# Patient Record
Sex: MALE | Race: WHITE
[De-identification: names, ages, dates, MRNs, and addresses within clinical notes are randomized per-mention and may not be internally consistent; named-entity substitution may affect disease eponyms.]

---

## 2019-02-20 PROBLEM — Z00.00 ENCOUNTER FOR PREVENTIVE HEALTH EXAMINATION: Status: ACTIVE | Noted: 2019-02-20

## 2019-02-22 ENCOUNTER — APPOINTMENT (OUTPATIENT)
Age: 69
End: 2019-02-22

## 2019-02-22 ENCOUNTER — OUTPATIENT (OUTPATIENT)
Dept: OUTPATIENT SERVICES | Facility: HOSPITAL | Age: 69
LOS: 1 days | End: 2019-02-22

## 2019-02-22 DIAGNOSIS — H26.9 UNSPECIFIED CATARACT: ICD-10-CM

## 2019-09-12 ENCOUNTER — OFFICE VISIT (OUTPATIENT)
Dept: GASTROENTEROLOGY | Facility: CLINIC | Age: 69
End: 2019-09-12
Payer: MEDICARE

## 2019-09-12 VITALS — WEIGHT: 234 LBS | HEART RATE: 66 BPM | DIASTOLIC BLOOD PRESSURE: 78 MMHG | SYSTOLIC BLOOD PRESSURE: 128 MMHG

## 2019-09-12 DIAGNOSIS — Z86.010 HISTORY OF COLON POLYPS: ICD-10-CM

## 2019-09-12 DIAGNOSIS — K21.9 GASTROESOPHAGEAL REFLUX DISEASE, ESOPHAGITIS PRESENCE NOT SPECIFIED: Primary | ICD-10-CM

## 2019-09-12 DIAGNOSIS — R12 HEARTBURN: ICD-10-CM

## 2019-09-12 DIAGNOSIS — R13.14 PHARYNGOESOPHAGEAL DYSPHAGIA: ICD-10-CM

## 2019-09-12 PROCEDURE — 99204 OFFICE O/P NEW MOD 45 MIN: CPT | Performed by: INTERNAL MEDICINE

## 2019-09-12 RX ORDER — CARVEDILOL 6.25 MG/1
6.25 TABLET ORAL 2 TIMES DAILY WITH MEALS
COMMUNITY

## 2019-09-12 RX ORDER — LEVOTHYROXINE SODIUM 0.05 MG/1
50 TABLET ORAL DAILY
COMMUNITY

## 2019-09-12 RX ORDER — ISOSORBIDE DINITRATE 30 MG/1
30 TABLET ORAL 4 TIMES DAILY
COMMUNITY

## 2019-09-12 RX ORDER — FAMOTIDINE 20 MG/1
20 TABLET, FILM COATED ORAL 2 TIMES DAILY
COMMUNITY

## 2019-09-12 RX ORDER — ALBUTEROL SULFATE 90 UG/1
2 AEROSOL, METERED RESPIRATORY (INHALATION) EVERY 6 HOURS PRN
COMMUNITY

## 2019-09-12 RX ORDER — LEVALBUTEROL TARTRATE 45 UG/1
1-2 AEROSOL, METERED ORAL EVERY 4 HOURS PRN
COMMUNITY
End: 2019-09-24

## 2019-09-12 RX ORDER — CLOPIDOGREL BISULFATE 75 MG/1
75 TABLET ORAL DAILY
COMMUNITY

## 2019-09-12 RX ORDER — ATORVASTATIN CALCIUM 40 MG/1
40 TABLET, FILM COATED ORAL DAILY
COMMUNITY

## 2019-09-12 RX ORDER — NITROGLYCERIN 0.4 MG/1
0.4 TABLET SUBLINGUAL
COMMUNITY

## 2019-09-12 NOTE — H&P (VIEW-ONLY)
[facility]    Dear Valentina Huitron,    I had the pleasure of seeing your patient Donis Saint in the office today and I thank you for this kind referral        Chief Complaint:  Reflux      HPI:  Donis Saint is a 68 y o  male who presents with a long history of reflux symptomatology  According patient this is been getting worse  He now has a sensation of food sticking on the way down  This frequently causes him some respiratory difficulty while eating  He has to wash it down with small sips of water  He then feels that the food just lays there and sometimes regurgitates  He does not have actual early satiety  He has had some recent weight gain  No change in the consistency of his skin  No Raynaud's  He is having significant nocturnal symptomatology  He has no melena or hematochezia  He has no nausea or vomiting  He has heartburn  He does not have any significant exertional symptomatology  He does have a past history of heart disease but recently saw his cardiologist to feels he is stable  He does get dyspnea on exertion  He has no other definitive exacerbating or remitting factors for his GERD  He does not always weight 2-3 hours after eating before going to bed  He and his wife do have a tilting bed but they do not use it because it is uncomfortable for his wife  Therefore he sleeps flat out  He has no other GI symptomatology  He has a past history of colon polyps  His last colonoscopy was 5 years ago  He has no change in bowel habits, change in stool caliber, melanotic stool, hematochezia, rectal bleeding, tenesmus, or weight loss  He does have a stable 4 3 cm x 4 2 cm infrarenal abdominal aortic aneurysm  He has no chest pain dizziness or loss of consciousness        Review of Systems:   Constitutional: No fever or chills, feels well, no tiredness, positive weight gain  HENT: No complaints of earache, no hearing loss, no nosebleeds, no nasal discharge, no sore throat, no hoarseness  Eyes: No complaints of eye pain, no red eyes, no discharge from eyes, no itchy eyes  Cardiovascular: No complaints of slow heart rate, no fast heart rate, no chest pain, no palpitations, no leg claudication, no lower extremity edema  Respiratory:  Positive shortness of breath, no wheezing, no cough, no SOB on exertion, no orthopnea  Gastrointestinal: As noted in HPI  Genitourinary: No complaints of dysuria, no incontinence, no hesitancy, no nocturia  Musculoskeletal:  Positive arthralgia, no myalgias, no joint swelling or stiffness, no limb pain or swelling  Neurological: No complaints of headache, no confusion, no convulsions, no numbness or tingling, no dizziness or fainting, no limb weakness, no difficulty walking  Skin: No complaints of skin rash or skin lesions, no itching, no skin wound, no dry skin  Hematological/Lymphatic: No complaints of swollen glands, does not bleed easy  Allergic/Immunologic: No immunocompromised state  Endocrine:  No complaints of polyuria, no polydipsia  Psychiatric/Behavioral: is not suicidal, no sleep disturbances, no anxiety or depression, no change in personality, no emotional problems         Historical Information   Past Medical History:   Diagnosis Date    [facility] [address] )     [facility]     Coronary artery disease     GERD (gastroesophageal reflux disease)     Hyperglycemia     Hyperlipidemia     Hypertension     Hypothyroid     Obesity      Past Surgical History:   Procedure Laterality Date    CATARACT EXTRACTION      CHOLECYSTECTOMY      CORONARY ARTERY BYPASS GRAFT      HERNIA REPAIR      QUADRICEPSPLASTY  2011    TONSILLECTOMY       Social History   Social History     Substance and Sexual Activity   Alcohol Use Not on file     Social History     Substance and Sexual Activity   Drug Use Not on file     Social History     Tobacco Use   Smoking Status Not on file Family History   Problem Relation Age of Onset    Alzheimer's disease Mother     Arthritis Mother          Current Medications: has a current medication list which includes the following prescription(s): albuterol, aspirin, atorvastatin, carvedilol, clopidogrel, famotidine, isosorbide dinitrate, levalbuterol, levothyroxine, and nitroglycerin  Vital Signs: /78   Pulse 66   Wt 106 kg (234 lb)     Physical Exam:   Constitutional  General Appearance: No acute distress, well appearing and well nourished, obese  Head  Normocephalic  Eyes  Conjunctivae and lids: No swelling, erythema, or discharge  Pupils and irises: Equal, round and reactive to light  Ears, Nose, Mouth, and Throat  External inspection of ears and nose: Normal  Nasal mucosa, septum and turbinates: Normal without edema or erythema/   Oropharynx: Normal with no erythema, edema, exudate or lesions  Neck  Normal range of motion  Neck supple  Cardiovascular  Auscultation of the heart: Normal rate and rhythm, normal S1 and S2 without murmurs  Examination of the extremities for edema and/or varicosities: Normal  Pulmonary/Chest  Respiratory effort: No increased work of breathing or signs of respiratory distress  Mild gynecomastia  Auscultation of lungs: Clear to auscultation, equal breath sounds bilaterally, no wheezes, rales, no rhonchi  Abdomen  Abdomen: Non-tender, no masses  No succussion splash  Small  Reducible nontender umbilical hernia  Liver and spleen: No hepatomegaly or splenomegaly  Musculoskeletal  Gait and station: normal   Digits and Nails: normal without clubbing or cyanosis  Inspection/palpation of joints, bones, and muscles: Normal  Neurological  No nystagmus or asterixis  Skin  Skin and subcutaneous tissue: Normal without rashes or lesions  Lymphatic  Palpation of the lymph nodes in neck: No lymphadenopathy     Psychiatric  Orientation to person, place and time: Normal   Mood and affect: Normal  Labs:   No results found for: ALT, AST, BUN, CALCIUM, CL, CHOL, CO2, CREATININE, GFRAA, GFRNONAA, HDL, HCT, HGB, HGBA1C, LDL, MG, PHOS, PLT, K, PSA, NA, TRIG, WBC      X-Rays & Procedures:   No orders to display         ______________________________________________________________________      Assessment & Plan:      Diagnoses and all orders for this visit:    Gastroesophageal reflux disease, esophagitis presence not specified    Heartburn    Pharyngoesophageal dysphagia    History of colon polyps        I have taken liberty of scheduling patient for both EGD and colonoscopy  We will also obtain a gastric emptying study  We went over all of the anti reflux measures  He will obtain a wedge  He will begin to take his famotidine b i d  He will not eat within 3 hours of bedtime  Dietary recommendations were provided  Will be happy to inform you of his results and any further recommendations  I would like to thank you for allowing me to participate in his care              With warmest regards,    Ricky Cosby MD, [facility]

## 2019-09-12 NOTE — PROGRESS NOTES
[facility]    Dear Antionette Toro,    I had the pleasure of seeing your patient Sesar Hebert in the office today and I thank you for this kind referral        Chief Complaint:  Reflux      HPI:  Sesar Hebert is a 68 y o  male who presents with a long history of reflux symptomatology  According patient this is been getting worse  He now has a sensation of food sticking on the way down  This frequently causes him some respiratory difficulty while eating  He has to wash it down with small sips of water  He then feels that the food just lays there and sometimes regurgitates  He does not have actual early satiety  He has had some recent weight gain  No change in the consistency of his skin  No Raynaud's  He is having significant nocturnal symptomatology  He has no melena or hematochezia  He has no nausea or vomiting  He has heartburn  He does not have any significant exertional symptomatology  He does have a past history of heart disease but recently saw his cardiologist to feels he is stable  He does get dyspnea on exertion  He has no other definitive exacerbating or remitting factors for his GERD  He does not always weight 2-3 hours after eating before going to bed  He and his wife do have a tilting bed but they do not use it because it is uncomfortable for his wife  Therefore he sleeps flat out  He has no other GI symptomatology  He has a past history of colon polyps  His last colonoscopy was 5 years ago  He has no change in bowel habits, change in stool caliber, melanotic stool, hematochezia, rectal bleeding, tenesmus, or weight loss  He does have a stable 4 3 cm x 4 2 cm infrarenal abdominal aortic aneurysm  He has no chest pain dizziness or loss of consciousness        Review of Systems:   Constitutional: No fever or chills, feels well, no tiredness, positive weight gain  HENT: No complaints of earache, no hearing loss, no nosebleeds, no nasal discharge, no sore throat, no hoarseness  Eyes: No complaints of eye pain, no red eyes, no discharge from eyes, no itchy eyes  Cardiovascular: No complaints of slow heart rate, no fast heart rate, no chest pain, no palpitations, no leg claudication, no lower extremity edema  Respiratory:  Positive shortness of breath, no wheezing, no cough, no SOB on exertion, no orthopnea  Gastrointestinal: As noted in HPI  Genitourinary: No complaints of dysuria, no incontinence, no hesitancy, no nocturia  Musculoskeletal:  Positive arthralgia, no myalgias, no joint swelling or stiffness, no limb pain or swelling  Neurological: No complaints of headache, no confusion, no convulsions, no numbness or tingling, no dizziness or fainting, no limb weakness, no difficulty walking  Skin: No complaints of skin rash or skin lesions, no itching, no skin wound, no dry skin  Hematological/Lymphatic: No complaints of swollen glands, does not bleed easy  Allergic/Immunologic: No immunocompromised state  Endocrine:  No complaints of polyuria, no polydipsia  Psychiatric/Behavioral: is not suicidal, no sleep disturbances, no anxiety or depression, no change in personality, no emotional problems         Historical Information   Past Medical History:   Diagnosis Date    AAA [facility]ab[facility]) ([facility] [address]     [facility]     Coronary artery disease     GERD (gastroesophageal reflux disease)     Hyperglycemia     Hyperlipidemia     Hypertension     Hypothyroid     Obesity      Past Surgical History:   Procedure Laterality Date    CATARACT EXTRACTION      CHOLECYSTECTOMY      CORONARY ARTERY BYPASS GRAFT      HERNIA REPAIR      QUADRICEPSPLASTY  2011    TONSILLECTOMY       Social History   Social History     Substance and Sexual Activity   Alcohol Use Not on file     Social History     Substance and Sexual Activity   Drug Use Not on file     Social History     Tobacco Use   Smoking Status Not on file Family History   Problem Relation Age of Onset    Alzheimer's disease Mother     Arthritis Mother          Current Medications: has a current medication list which includes the following prescription(s): albuterol, aspirin, atorvastatin, carvedilol, clopidogrel, famotidine, isosorbide dinitrate, levalbuterol, levothyroxine, and nitroglycerin  Vital Signs: /78   Pulse 66   Wt 106 kg (234 lb)     Physical Exam:   Constitutional  General Appearance: No acute distress, well appearing and well nourished, obese  Head  Normocephalic  Eyes  Conjunctivae and lids: No swelling, erythema, or discharge  Pupils and irises: Equal, round and reactive to light  Ears, Nose, Mouth, and Throat  External inspection of ears and nose: Normal  Nasal mucosa, septum and turbinates: Normal without edema or erythema/   Oropharynx: Normal with no erythema, edema, exudate or lesions  Neck  Normal range of motion  Neck supple  Cardiovascular  Auscultation of the heart: Normal rate and rhythm, normal S1 and S2 without murmurs  Examination of the extremities for edema and/or varicosities: Normal  Pulmonary/Chest  Respiratory effort: No increased work of breathing or signs of respiratory distress  Mild gynecomastia  Auscultation of lungs: Clear to auscultation, equal breath sounds bilaterally, no wheezes, rales, no rhonchi  Abdomen  Abdomen: Non-tender, no masses  No succussion splash  Small  Reducible nontender umbilical hernia  Liver and spleen: No hepatomegaly or splenomegaly  Musculoskeletal  Gait and station: normal   Digits and Nails: normal without clubbing or cyanosis  Inspection/palpation of joints, bones, and muscles: Normal  Neurological  No nystagmus or asterixis  Skin  Skin and subcutaneous tissue: Normal without rashes or lesions  Lymphatic  Palpation of the lymph nodes in neck: No lymphadenopathy     Psychiatric  Orientation to person, place and time: Normal   Mood and affect: Normal  Labs:   No results found for: ALT, AST, BUN, CALCIUM, CL, CHOL, CO2, CREATININE, GFRAA, GFRNONAA, HDL, HCT, HGB, HGBA1C, LDL, MG, PHOS, PLT, K, PSA, NA, TRIG, WBC      X-Rays & Procedures:   No orders to display         ______________________________________________________________________      Assessment & Plan:      Diagnoses and all orders for this visit:    Gastroesophageal reflux disease, esophagitis presence not specified    Heartburn    Pharyngoesophageal dysphagia    History of colon polyps        I have taken liberty of scheduling patient for both EGD and colonoscopy  We will also obtain a gastric emptying study  We went over all of the anti reflux measures  He will obtain a wedge  He will begin to take his famotidine b i d  He will not eat within 3 hours of bedtime  Dietary recommendations were provided  Will be happy to inform you of his results and any further recommendations  I would like to thank you for allowing me to participate in his care              With warmest regards,    Roosevelt Candelario MD, [facility]

## 2019-09-12 NOTE — LETTER
September 12, 2019     Yanna Carrasco MD  [address] [place] [id number]    Patient: Orman Simmonds   YOB: 1950   Date of Visit: 9/12/2019       Dear Dr Bryan Junior:    Thank you for referring Orman Simmonds to me for evaluation  Below are my notes for this consultation  If you have questions, please do not hesitate to call me  I look forward to following your patient along with you  Sincerely,        Adrianna Evans MD        CC: No Recipients  Adrianna Evans MD  9/12/2019  3:12 PM  Incomplete  [facility]    Dear Eden Pitt,    I had the pleasure of seeing your patient Orman Simmonds in the office today and I thank you for this kind referral        Chief Complaint:  Reflux      HPI:  Orman Simmonds is a 68 y o  male who presents with a long history of reflux symptomatology  According patient this is been getting worse  He now has a sensation of food sticking on the way down  This frequently causes him some respiratory difficulty while eating  He has to wash it down with small sips of water  He then feels that the food just lays there and sometimes regurgitates  He does not have actual early satiety  He has had some recent weight gain  No change in the consistency of his skin  No Raynaud's  He is having significant nocturnal symptomatology  He has no melena or hematochezia  He has no nausea or vomiting  He has heartburn  He does not have any significant exertional symptomatology  He does have a past history of heart disease but recently saw his cardiologist to feels he is stable  He does get dyspnea on exertion  He has no other definitive exacerbating or remitting factors for his GERD  He does not always weight 2-3 hours after eating before going to bed  He and his wife do have a tilting bed but they do not use it because it is uncomfortable for his wife  Therefore he sleeps flat out  He has no other GI symptomatology    He has a past history of colon polyps  His last colonoscopy was 5 years ago  He has no change in bowel habits, change in stool caliber, melanotic stool, hematochezia, rectal bleeding, tenesmus, or weight loss  He does have a stable 4 3 cm x 4 2 cm infrarenal abdominal aortic aneurysm  He has no chest pain dizziness or loss of consciousness  Review of Systems:   Constitutional: No fever or chills, feels well, no tiredness, positive weight gain  HENT: No complaints of earache, no hearing loss, no nosebleeds, no nasal discharge, no sore throat, no hoarseness  Eyes: No complaints of eye pain, no red eyes, no discharge from eyes, no itchy eyes  Cardiovascular: No complaints of slow heart rate, no fast heart rate, no chest pain, no palpitations, no leg claudication, no lower extremity edema  Respiratory:  Positive shortness of breath, no wheezing, no cough, no SOB on exertion, no orthopnea  Gastrointestinal: As noted in HPI  Genitourinary: No complaints of dysuria, no incontinence, no hesitancy, no nocturia  Musculoskeletal:  Positive arthralgia, no myalgias, no joint swelling or stiffness, no limb pain or swelling  Neurological: No complaints of headache, no confusion, no convulsions, no numbness or tingling, no dizziness or fainting, no limb weakness, no difficulty walking  Skin: No complaints of skin rash or skin lesions, no itching, no skin wound, no dry skin  Hematological/Lymphatic: No complaints of swollen glands, does not bleed easy  Allergic/Immunologic: No immunocompromised state  Endocrine:  No complaints of polyuria, no polydipsia  Psychiatric/Behavioral: is not suicidal, no sleep disturbances, no anxiety or depression, no change in personality, no emotional problems         Historical Information   Past Medical History:   Diagnosis Date    [facility] ([facility] [address] [facility]     COPD (chronic obstructive pulmonary disease) (H[facility])     Coronary artery disease     GERD (gastroesophageal reflux disease)     Hyperglycemia     Hyperlipidemia     Hypertension     Hypothyroid     Obesity      Past Surgical History:   Procedure Laterality Date    CATARACT EXTRACTION      CHOLECYSTECTOMY      CORONARY ARTERY BYPASS GRAFT      HERNIA REPAIR      QUADRICEPSPLASTY  2011    TONSILLECTOMY       Social History   Social History     Substance and Sexual Activity   Alcohol Use Not on file     Social History     Substance and Sexual Activity   Drug Use Not on file     Social History     Tobacco Use   Smoking Status Not on file     Family History   Problem Relation Age of Onset    Alzheimer's disease Mother     Arthritis Mother          Current Medications: has a current medication list which includes the following prescription(s): albuterol, aspirin, atorvastatin, carvedilol, clopidogrel, famotidine, isosorbide dinitrate, levalbuterol, levothyroxine, and nitroglycerin  Vital Signs: /78   Pulse 66   Wt 106 kg (234 lb)     Physical Exam:   Constitutional  General Appearance: No acute distress, well appearing and well nourished, obese  Head  Normocephalic  Eyes  Conjunctivae and lids: No swelling, erythema, or discharge  Pupils and irises: Equal, round and reactive to light  Ears, Nose, Mouth, and Throat  External inspection of ears and nose: Normal  Nasal mucosa, septum and turbinates: Normal without edema or erythema/   Oropharynx: Normal with no erythema, edema, exudate or lesions  Neck  Normal range of motion  Neck supple  Cardiovascular  Auscultation of the heart: Normal rate and rhythm, normal S1 and S2 without murmurs  Examination of the extremities for edema and/or varicosities: Normal  Pulmonary/Chest  Respiratory effort: No increased work of breathing or signs of respiratory distress  Mild gynecomastia  Auscultation of lungs: Clear to auscultation, equal breath sounds bilaterally, no wheezes, rales, no rhonchi     Abdomen  Abdomen: Non-tender, no masses  No succussion splash  Small  Reducible nontender umbilical hernia  Liver and spleen: No hepatomegaly or splenomegaly  Musculoskeletal  Gait and station: normal   Digits and Nails: normal without clubbing or cyanosis  Inspection/palpation of joints, bones, and muscles: Normal  Neurological  No nystagmus or asterixis  Skin  Skin and subcutaneous tissue: Normal without rashes or lesions  Lymphatic  Palpation of the lymph nodes in neck: No lymphadenopathy  Psychiatric  Orientation to person, place and time: Normal   Mood and affect: Normal          Labs:   No results found for: ALT, AST, BUN, CALCIUM, CL, CHOL, CO2, CREATININE, GFRAA, GFRNONAA, HDL, HCT, HGB, HGBA1C, LDL, MG, PHOS, PLT, K, PSA, NA, TRIG, WBC      X-Rays & Procedures:   No orders to display         ______________________________________________________________________      Assessment & Plan:      Diagnoses and all orders for this visit:    Gastroesophageal reflux disease, esophagitis presence not specified    Heartburn    Pharyngoesophageal dysphagia    History of colon polyps        I have taken liberty of scheduling patient for both EGD and colonoscopy  We will also obtain a gastric emptying study  We went over all of the anti reflux measures  He will obtain a wedge  He will begin to take his famotidine b i d  He will not eat within 3 hours of bedtime  Dietary recommendations were provided  Will be happy to inform you of his results and any further recommendations  I would like to thank you for allowing me to participate in his care              With warmest regards,    Lily Patten MD, [facility]

## 2019-09-24 ENCOUNTER — ANESTHESIA EVENT (OUTPATIENT)
Dept: GASTROENTEROLOGY | Facility: HOSPITAL | Age: 69
End: 2019-09-24

## 2019-09-24 ENCOUNTER — HOSPITAL ENCOUNTER (OUTPATIENT)
Dept: GASTROENTEROLOGY | Facility: HOSPITAL | Age: 69
Setting detail: OUTPATIENT SURGERY
Discharge: HOME | End: 2019-09-24
Attending: INTERNAL MEDICINE | Admitting: INTERNAL MEDICINE
Payer: MEDICARE

## 2019-09-24 ENCOUNTER — ANESTHESIA (OUTPATIENT)
Dept: GASTROENTEROLOGY | Facility: HOSPITAL | Age: 69
End: 2019-09-24

## 2019-09-24 VITALS
SYSTOLIC BLOOD PRESSURE: 111 MMHG | HEIGHT: 66 IN | DIASTOLIC BLOOD PRESSURE: 54 MMHG | OXYGEN SATURATION: 91 % | RESPIRATION RATE: 20 BRPM | WEIGHT: 226.85 LBS | BODY MASS INDEX: 36.46 KG/M2 | HEART RATE: 91 BPM | TEMPERATURE: 97.1 F

## 2019-09-24 DIAGNOSIS — K21.9 GASTROESOPHAGEAL REFLUX DISEASE, ESOPHAGITIS PRESENCE NOT SPECIFIED: ICD-10-CM

## 2019-09-24 DIAGNOSIS — R13.14 PHARYNGOESOPHAGEAL DYSPHAGIA: ICD-10-CM

## 2019-09-24 DIAGNOSIS — Z86.010 HISTORY OF COLON POLYPS: ICD-10-CM

## 2019-09-24 DIAGNOSIS — R12 HEARTBURN: ICD-10-CM

## 2019-09-24 PROCEDURE — 88305 TISSUE EXAM BY PATHOLOGIST: CPT | Performed by: PATHOLOGY

## 2019-09-24 PROCEDURE — NC001 PR NO CHARGE: Performed by: INTERNAL MEDICINE

## 2019-09-24 PROCEDURE — 88312 SPECIAL STAINS GROUP 1: CPT | Performed by: PATHOLOGY

## 2019-09-24 PROCEDURE — 1123F ACP DISCUSS/DSCN MKR DOCD: CPT | Performed by: INTERNAL MEDICINE

## 2019-09-24 PROCEDURE — 43248 EGD GUIDE WIRE INSERTION: CPT | Performed by: INTERNAL MEDICINE

## 2019-09-24 PROCEDURE — 45385 COLONOSCOPY W/LESION REMOVAL: CPT | Performed by: INTERNAL MEDICINE

## 2019-09-24 PROCEDURE — 43239 EGD BIOPSY SINGLE/MULTIPLE: CPT | Performed by: INTERNAL MEDICINE

## 2019-09-24 RX ORDER — KETAMINE HCL IN NACL, ISO-OSM 100MG/10ML
SYRINGE (ML) INJECTION AS NEEDED
Status: DISCONTINUED | OUTPATIENT
Start: 2019-09-24 | End: 2019-09-24 | Stop reason: SURG

## 2019-09-24 RX ORDER — GLYCOPYRROLATE 0.2 MG/ML
INJECTION INTRAMUSCULAR; INTRAVENOUS AS NEEDED
Status: DISCONTINUED | OUTPATIENT
Start: 2019-09-24 | End: 2019-09-24 | Stop reason: SURG

## 2019-09-24 RX ORDER — ALBUTEROL SULFATE 90 UG/1
AEROSOL, METERED RESPIRATORY (INHALATION) AS NEEDED
Status: DISCONTINUED | OUTPATIENT
Start: 2019-09-24 | End: 2019-09-24 | Stop reason: SURG

## 2019-09-24 RX ORDER — PROPOFOL 10 MG/ML
INJECTION, EMULSION INTRAVENOUS AS NEEDED
Status: DISCONTINUED | OUTPATIENT
Start: 2019-09-24 | End: 2019-09-24 | Stop reason: SURG

## 2019-09-24 RX ORDER — SODIUM CHLORIDE, SODIUM LACTATE, POTASSIUM CHLORIDE, CALCIUM CHLORIDE 600; 310; 30; 20 MG/100ML; MG/100ML; MG/100ML; MG/100ML
INJECTION, SOLUTION INTRAVENOUS CONTINUOUS PRN
Status: DISCONTINUED | OUTPATIENT
Start: 2019-09-24 | End: 2019-09-24 | Stop reason: SURG

## 2019-09-24 RX ORDER — LIDOCAINE HYDROCHLORIDE 10 MG/ML
INJECTION, SOLUTION INFILTRATION; PERINEURAL AS NEEDED
Status: DISCONTINUED | OUTPATIENT
Start: 2019-09-24 | End: 2019-09-24 | Stop reason: SURG

## 2019-09-24 RX ADMIN — ALBUTEROL SULFATE 2 PUFF: 90 AEROSOL, METERED RESPIRATORY (INHALATION) at 10:56

## 2019-09-24 RX ADMIN — Medication 20 MG: at 11:00

## 2019-09-24 RX ADMIN — PROPOFOL 100 MG: 10 INJECTION, EMULSION INTRAVENOUS at 11:00

## 2019-09-24 RX ADMIN — SODIUM CHLORIDE, SODIUM LACTATE, POTASSIUM CHLORIDE, AND CALCIUM CHLORIDE: .6; .31; .03; .02 INJECTION, SOLUTION INTRAVENOUS at 10:49

## 2019-09-24 RX ADMIN — PROPOFOL 50 MG: 10 INJECTION, EMULSION INTRAVENOUS at 11:33

## 2019-09-24 RX ADMIN — GLYCOPYRROLATE 0.2 MG: 0.2 INJECTION, SOLUTION INTRAMUSCULAR; INTRAVENOUS at 11:00

## 2019-09-24 RX ADMIN — PROPOFOL 50 MG: 10 INJECTION, EMULSION INTRAVENOUS at 11:29

## 2019-09-24 RX ADMIN — PROPOFOL 50 MG: 10 INJECTION, EMULSION INTRAVENOUS at 11:11

## 2019-09-24 RX ADMIN — PROPOFOL 50 MG: 10 INJECTION, EMULSION INTRAVENOUS at 11:06

## 2019-09-24 RX ADMIN — LIDOCAINE HYDROCHLORIDE 50 MG: 10 INJECTION, SOLUTION INFILTRATION; PERINEURAL at 11:00

## 2019-09-24 RX ADMIN — PROPOFOL 50 MG: 10 INJECTION, EMULSION INTRAVENOUS at 11:18

## 2019-09-24 RX ADMIN — PROPOFOL 50 MG: 10 INJECTION, EMULSION INTRAVENOUS at 11:25

## 2019-09-24 NOTE — DISCHARGE INSTRUCTIONS

## 2019-09-24 NOTE — ANESTHESIA POSTPROCEDURE EVALUATION
Post-Op Assessment Note    CV Status:  Stable  Pain Score: 0    Pain management: adequate     Mental Status:  Somnolent   Hydration Status:  Euvolemic   PONV Controlled:  Controlled   Airway Patency:  Patent   Post Op Vitals Reviewed: Yes      Staff: CRNA           /59 (09/24/19 1141)    Temp      Pulse 97 (09/24/19 1141)   Resp 12 (09/24/19 1141)    SpO2 92 % (09/24/19 1141)

## 2019-09-24 NOTE — ANESTHESIA PREPROCEDURE EVALUATION
Review of Systems/Medical History  Patient summary reviewed  Chart reviewed      Cardiovascular  Exercise tolerance (METS): <4,  Hyperlipidemia, Hypertension , CAD , History of CABG, Cardiac stents  CHF compensated CHF, No BLUE,   Comment: Hx of CABG ~2011, Coronary stents ~2017  Denies CP/SOB  ,  Pulmonary  Smoker ex-smoker  , COPD moderate- medication dependent , No shortness of breath, Sleep apnea ,        GI/Hepatic    GERD ,             Endo/Other    Obesity    GYN       Hematology   Musculoskeletal    Arthritis     Neurology   Psychology   Negative psychology ROS              Physical Exam    Airway    Mallampati score: II  TM Distance: >3 FB       Dental   upper dentures and lower dentures,     Cardiovascular  Rhythm: regular, Rate: normal,     Pulmonary  Pulmonary exam normal     Other Findings        Anesthesia Plan  ASA Score- 3     Anesthesia Type- IV sedation with anesthesia with ASA Monitors  Additional Monitors:   Airway Plan:         Plan Factors-Patient not instructed to abstain from smoking on day of procedure  Patient did not smoke on day of surgery  Induction- intravenous  Postoperative Plan-     Informed Consent- Anesthetic plan and risks discussed with patient  I personally reviewed this patient with the CRNA  Discussed and agreed on the Anesthesia Plan with the CRNA  [place] Victor

## 2019-10-03 ENCOUNTER — TELEPHONE (OUTPATIENT)
Dept: GASTROENTEROLOGY | Facility: CLINIC | Age: 69
End: 2019-10-03

## 2019-11-14 ENCOUNTER — TELEPHONE (OUTPATIENT)
Dept: GASTROENTEROLOGY | Facility: CLINIC | Age: 69
End: 2019-11-14

## 2019-11-14 NOTE — TELEPHONE ENCOUNTER
Patient called want to know should he still get the gastric emptying study after having the EGD and Colonoscopy?

## 2019-11-14 NOTE — TELEPHONE ENCOUNTER
Please tell him yes, we need to complete the workup to test the stomach function and that is the only test that will do it

## 2019-12-13 ENCOUNTER — HOSPITAL ENCOUNTER (OUTPATIENT)
Dept: NUCLEAR MEDICINE | Facility: HOSPITAL | Age: 69
Discharge: HOME | End: 2019-12-13
Attending: INTERNAL MEDICINE
Payer: MEDICARE

## 2019-12-13 DIAGNOSIS — R12 HEARTBURN: ICD-10-CM

## 2019-12-13 DIAGNOSIS — K21.9 GASTROESOPHAGEAL REFLUX DISEASE, ESOPHAGITIS PRESENCE NOT SPECIFIED: ICD-10-CM

## 2019-12-13 PROCEDURE — A9541 TC99M SULFUR COLLOID: HCPCS

## 2019-12-13 PROCEDURE — 78264 GASTRIC EMPTYING IMG STUDY: CPT

## 2019-12-26 ENCOUNTER — TELEPHONE (OUTPATIENT)
Dept: GASTROENTEROLOGY | Facility: CLINIC | Age: 69
End: 2019-12-26

## 2021-02-24 ENCOUNTER — PREP FOR PROCEDURE (OUTPATIENT)
Dept: GASTROENTEROLOGY | Facility: CLINIC | Age: 71
End: 2021-02-24

## 2021-02-24 ENCOUNTER — LAB (OUTPATIENT)
Dept: LAB | Facility: HOSPITAL | Age: 71
End: 2021-02-24
Attending: INTERNAL MEDICINE
Payer: MEDICARE

## 2021-02-24 ENCOUNTER — OFFICE VISIT (OUTPATIENT)
Dept: GASTROENTEROLOGY | Facility: CLINIC | Age: 71
End: 2021-02-24
Payer: MEDICARE

## 2021-02-24 VITALS
DIASTOLIC BLOOD PRESSURE: 60 MMHG | HEIGHT: 65 IN | BODY MASS INDEX: 39.49 KG/M2 | SYSTOLIC BLOOD PRESSURE: 116 MMHG | HEART RATE: 87 BPM | WEIGHT: 237 LBS

## 2021-02-24 DIAGNOSIS — E03.8 OTHER SPECIFIED HYPOTHYROIDISM: ICD-10-CM

## 2021-02-24 DIAGNOSIS — Z86.010 HISTORY OF COLON POLYPS: ICD-10-CM

## 2021-02-24 DIAGNOSIS — R13.19 ESOPHAGEAL DYSPHAGIA: Primary | ICD-10-CM

## 2021-02-24 DIAGNOSIS — E03.8 OTHER SPECIFIED HYPOTHYROIDISM: Primary | ICD-10-CM

## 2021-02-24 DIAGNOSIS — K21.9 GASTROESOPHAGEAL REFLUX DISEASE WITHOUT ESOPHAGITIS: Primary | ICD-10-CM

## 2021-02-24 DIAGNOSIS — R13.19 ESOPHAGEAL DYSPHAGIA: ICD-10-CM

## 2021-02-24 LAB
T4 FREE SERPL-MCNC: 0.75 NG/DL (ref 0.76–1.46)
TSH SERPL DL<=0.05 MIU/L-ACNC: 4.77 UIU/ML (ref 0.36–3.74)

## 2021-02-24 PROCEDURE — 99214 OFFICE O/P EST MOD 30 MIN: CPT | Performed by: INTERNAL MEDICINE

## 2021-02-24 PROCEDURE — 84443 ASSAY THYROID STIM HORMONE: CPT

## 2021-02-24 PROCEDURE — 36415 COLL VENOUS BLD VENIPUNCTURE: CPT

## 2021-02-24 PROCEDURE — 84439 ASSAY OF FREE THYROXINE: CPT

## 2021-02-24 RX ORDER — PANTOPRAZOLE SODIUM 40 MG/1
40 TABLET, DELAYED RELEASE ORAL
Qty: 62 TABLET | Refills: 6 | Status: SHIPPED | OUTPATIENT
Start: 2021-02-24 | End: 2022-04-25 | Stop reason: SDUPTHER

## 2021-02-24 NOTE — PROGRESS NOTES
[facility] - Outpatient Follow-up Note  [facility] 66 y o  male MRN: [id number]  Encounter: [id number]          ASSESSMENT AND PLAN:      1  Gastroesophageal reflux disease without esophagitis    2  Esophageal dysphagia    3  History of colon polyps    Schedule egd with dilation    Stop pepcid    Rx pantoprazole 40 mg bid;   A prescription was sent to the pharmacy    Schedule colonoscopy 2022    ______________________________________________________________________    SUBJECTIVE:   This 42-year-old male comes to the office today for routine follow-up regarding his dysphagia complaint  He states that he has been having problems with ongoing dysphagia for the last several months  He is currently taking Pepcid 20 mg p o  b i d  and despite this he has continued to experience heartburn symptoms fairly frequently  He denies any abdominal pain  Denies any diarrhea or constipation  He denies nausea vomiting  His last esophagogastroduodenoscopy and colonoscopy were both performed on September 24, 2019  Dr Samuel Jacobo  Performed this examination  The EGD showed evidence of stricture in the distal esophagus which was dilated with an 18 mm Savary dilator  In addition to this the patient had a hiatal hernia  The patient's colonoscopy was   The remarkable for diverticulosis coli and 3 polyps of the colon, all of which were adenomas  REVIEW OF SYSTEMS IS OTHERWISE NEGATIVE        Historical Information   Past Medical History:   Diagnosis Date    AAA (abdominal aortic aneurysm) (HCC)     Colon polyp     COPD (chronic obstructive pulmonary disease) (HCC)     Coronary artery disease     Gallstone     GERD (gastroesophageal reflux disease)     Hyperglycemia     Hyperlipidemia     Hypertension     Hypothyroid     Obesity     Sleep apnea     not using prescribed cpap     Past Surgical History:   Procedure Laterality Date    CATARACT EXTRACTION      CHOLECYSTECTOMY      CORONARY ARTERY BYPASS GRAFT      HERNIA REPAIR      QUADRICEPSPLASTY  2011    TONSILLECTOMY       Social History   Social History     Substance and Sexual Activity   Alcohol Use Not Currently     Social History     Substance and Sexual Activity   Drug Use Not Currently     Social History     Tobacco Use   Smoking Status Former Smoker    Quit date: 2011    Years since quitting: 10 1   Smokeless Tobacco Never Used     Family History   Problem Relation Age of Onset    Alzheimer's disease Mother     Arthritis Mother        Meds/Allergies       Current Outpatient Medications:     albuterol (PROVENTIL HFA,VENTOLIN HFA) 90 mcg/act inhaler    aspirin 81 MG tablet    atorvastatin (LIPITOR) 40 mg tablet    carvedilol (COREG) 6 25 mg tablet    Cholecalciferol 50 MCG (2000 UT) CAPS    clopidogrel (PLAVIX) 75 mg tablet    famotidine (PEPCID) 20 mg tablet    isosorbide dinitrate (ISORDIL) 30 mg tablet    levothyroxine 50 mcg tablet    nitroglycerin (NITROSTAT) 0 4 mg SL tablet    umeclidinium-vilanterol (ANORO ELLIPTA) 62 5-25 MCG/INH inhaler    No Known Allergies        Objective     Blood pressure 116/60, pulse 87, height 5' 5" (1 651 m), weight 108 kg (237 lb)  Body mass index is 39 44 kg/m²  PHYSICAL EXAM:      General Appearance:   Alert, cooperative, no distress   HEENT:   Normocephalic, atraumatic, anicteric      Neck:  Supple, symmetrical, trachea midline   Lungs:   Clear to auscultation bilaterally; no rales, rhonchi or wheezing; respirations unlabored    Heart[de-identified]   Regular rate and rhythm; no murmur, rub, or gallop  Abdomen:   Soft, non-tender, non-distended; normal bowel sounds; no masses, no organomegaly    Genitalia:   Deferred    Rectal:   Deferred    Extremities:  No cyanosis, clubbing or edema    Pulses:  2+ and symmetric    Skin:  No jaundice, rashes, or lesions    Lymph nodes:  No palpable cervical lymphadenopathy        Lab Results:   No visits with results within 1 Day(s) from this visit  Latest known visit with results is:   Hospital Outpatient Visit on 09/24/2019   Component Date Value    Case Report 09/24/2019                      Value:Surgical Pathology Report                         Case: [id number]                                   Authorizing Provider:  Bridget Castillo MD      Collected:           09/24/2019 1119              Ordering Location:      [facility]       Received[facility]            [facility]/24/2019 [facility] [place] [facility]                                                             Pathologist:           Ellis Solano MD                                                              Specimens:   A) - Esophagogastric junction                                                                       B) - Polyp, Colorectal, proximal transverse                                                         C) - Polyp, Colorectal, hepatic flexure                                                             D) - Polyp, Colorectal, descending                                                         Addendum 09/24/2019                      Value: This result contains rich text formatting which cannot be displayed here   Final Diagnosis 09/24/2019                      Value: This result contains rich text formatting which cannot be displayed here   Note 09/24/2019                      Value: This result contains rich text formatting which cannot be displayed here   Additional Information 09/24/2019                      Value: This result contains rich text formatting which cannot be displayed here   Synoptic Checklist 09/24/2019                      Value:  (COLON/RECTUM POLYP FORM-GI - All Specimens)                                                                                                                 : Adenoma(s)     Kiki Cullen Description 09/24/2019                      Value: This result contains rich text formatting which cannot be displayed here   Clinical Information 09/24/2019                      Value:Cold bx r/o dysplasia         Radiology Results:   No results found

## 2021-02-25 ENCOUNTER — TELEPHONE (OUTPATIENT)
Dept: GASTROENTEROLOGY | Facility: CLINIC | Age: 71
End: 2021-02-25

## 2021-02-25 NOTE — TELEPHONE ENCOUNTER
Pt called had labs done yesterday dr Alyssia oRwley wanted to see outcome of the labs to be able to adjust medication for patient labs are in chart can we please review and send meds   meds can be sent to [facility] [address] can we please call patient to inform once this has happened

## 2021-02-25 NOTE — TELEPHONE ENCOUNTER
Please let him know that his levels are improving    He should continue to take his current dose of synthroid and f/u with his PCP

## 2021-03-09 DIAGNOSIS — Z23 ENCOUNTER FOR IMMUNIZATION: ICD-10-CM

## 2021-03-18 ENCOUNTER — ANESTHESIA (OUTPATIENT)
Dept: GASTROENTEROLOGY | Facility: HOSPITAL | Age: 71
End: 2021-03-18

## 2021-03-18 ENCOUNTER — ANESTHESIA EVENT (OUTPATIENT)
Dept: GASTROENTEROLOGY | Facility: HOSPITAL | Age: 71
End: 2021-03-18

## 2021-03-18 ENCOUNTER — HOSPITAL ENCOUNTER (OUTPATIENT)
Dept: GASTROENTEROLOGY | Facility: HOSPITAL | Age: 71
Setting detail: OUTPATIENT SURGERY
Discharge: HOME | End: 2021-03-18
Attending: INTERNAL MEDICINE | Admitting: INTERNAL MEDICINE
Payer: MEDICARE

## 2021-03-18 VITALS
HEIGHT: 65 IN | WEIGHT: 238.54 LBS | RESPIRATION RATE: 18 BRPM | BODY MASS INDEX: 39.74 KG/M2 | HEART RATE: 96 BPM | TEMPERATURE: 98.2 F | SYSTOLIC BLOOD PRESSURE: 143 MMHG | OXYGEN SATURATION: 95 % | DIASTOLIC BLOOD PRESSURE: 84 MMHG

## 2021-03-18 DIAGNOSIS — R13.19 ESOPHAGEAL DYSPHAGIA: ICD-10-CM

## 2021-03-18 PROBLEM — I10 HTN (HYPERTENSION): Status: ACTIVE | Noted: 2021-03-18

## 2021-03-18 PROBLEM — Z95.1 HISTORY OF CORONARY ARTERY BYPASS GRAFT: Status: ACTIVE | Noted: 2021-03-18

## 2021-03-18 PROBLEM — E78.5 HYPERLIPIDEMIA: Status: ACTIVE | Noted: 2021-03-18

## 2021-03-18 PROBLEM — I25.10 CAD (CORONARY ARTERY DISEASE): Status: ACTIVE | Noted: 2021-03-18

## 2021-03-18 PROBLEM — G47.30 SLEEP APNEA: Status: ACTIVE | Noted: 2021-03-18

## 2021-03-18 PROBLEM — J44.9 CHRONIC OBSTRUCTIVE PULMONARY DISEASE (HCC): Status: ACTIVE | Noted: 2021-03-18

## 2021-03-18 PROCEDURE — 88305 TISSUE EXAM BY PATHOLOGIST: CPT | Performed by: PATHOLOGY

## 2021-03-18 PROCEDURE — 43248 EGD GUIDE WIRE INSERTION: CPT | Performed by: INTERNAL MEDICINE

## 2021-03-18 PROCEDURE — 43239 EGD BIOPSY SINGLE/MULTIPLE: CPT | Performed by: INTERNAL MEDICINE

## 2021-03-18 RX ORDER — KETAMINE HCL IN NACL, ISO-OSM 100MG/10ML
SYRINGE (ML) INJECTION AS NEEDED
Status: DISCONTINUED | OUTPATIENT
Start: 2021-03-18 | End: 2021-03-18

## 2021-03-18 RX ORDER — LIDOCAINE HYDROCHLORIDE 20 MG/ML
INJECTION, SOLUTION EPIDURAL; INFILTRATION; INTRACAUDAL; PERINEURAL AS NEEDED
Status: DISCONTINUED | OUTPATIENT
Start: 2021-03-18 | End: 2021-03-18

## 2021-03-18 RX ORDER — PROPOFOL 10 MG/ML
INJECTION, EMULSION INTRAVENOUS AS NEEDED
Status: DISCONTINUED | OUTPATIENT
Start: 2021-03-18 | End: 2021-03-18

## 2021-03-18 RX ORDER — SODIUM CHLORIDE, SODIUM LACTATE, POTASSIUM CHLORIDE, CALCIUM CHLORIDE 600; 310; 30; 20 MG/100ML; MG/100ML; MG/100ML; MG/100ML
125 INJECTION, SOLUTION INTRAVENOUS CONTINUOUS
Status: DISCONTINUED | OUTPATIENT
Start: 2021-03-18 | End: 2021-03-22 | Stop reason: HOSPADM

## 2021-03-18 RX ORDER — GLYCOPYRROLATE 0.2 MG/ML
INJECTION INTRAMUSCULAR; INTRAVENOUS AS NEEDED
Status: DISCONTINUED | OUTPATIENT
Start: 2021-03-18 | End: 2021-03-18

## 2021-03-18 RX ORDER — SODIUM CHLORIDE, SODIUM LACTATE, POTASSIUM CHLORIDE, CALCIUM CHLORIDE 600; 310; 30; 20 MG/100ML; MG/100ML; MG/100ML; MG/100ML
INJECTION, SOLUTION INTRAVENOUS CONTINUOUS PRN
Status: DISCONTINUED | OUTPATIENT
Start: 2021-03-18 | End: 2021-03-18

## 2021-03-18 RX ADMIN — PROPOFOL 80 MG: 10 INJECTION, EMULSION INTRAVENOUS at 12:34

## 2021-03-18 RX ADMIN — LIDOCAINE HYDROCHLORIDE 100 MG: 20 INJECTION, SOLUTION EPIDURAL; INFILTRATION; INTRACAUDAL; PERINEURAL at 12:33

## 2021-03-18 RX ADMIN — Medication 20 MG: at 12:33

## 2021-03-18 RX ADMIN — GLYCOPYRROLATE 0.2 MG: 0.2 INJECTION, SOLUTION INTRAMUSCULAR; INTRAVENOUS at 12:28

## 2021-03-18 RX ADMIN — PROPOFOL 20 MG: 10 INJECTION, EMULSION INTRAVENOUS at 12:40

## 2021-03-18 RX ADMIN — SODIUM CHLORIDE, SODIUM LACTATE, POTASSIUM CHLORIDE, AND CALCIUM CHLORIDE: .6; .31; .03; .02 INJECTION, SOLUTION INTRAVENOUS at 12:28

## 2021-03-18 RX ADMIN — PROPOFOL 40 MG: 10 INJECTION, EMULSION INTRAVENOUS at 12:37

## 2021-03-18 NOTE — H&P
History and Physical - [facility]  [facility] 66 y o  male MRN: [id number]      HPI: [facility] is a 66y o  year old male who presents for evaluation of gastroesophageal reflux disease and dysphagia      REVIEW OF SYSTEMS: Per the HPI, and otherwise unremarkable  Historical Information   Past Medical History:   Diagnosis Date    AAA (abdominal aortic aneurysm) ([address] )     Colon polyp     COPD (chronic obstructive pulmonary disease) (HCC)     Coronary artery disease     Gallstone     GERD (gastroesophageal reflux disease)     Hyperglycemia     Hyperlipidemia     Hypertension     Hypothyroid     Obesity     Sleep apnea     not using prescribed cpap     Past Surgical History:   Procedure Laterality Date    CATARACT EXTRACTION      CHOLECYSTECTOMY      CORONARY ARTERY BYPASS GRAFT      HERNIA REPAIR      QUADRICEPSPLASTY  2011    TONSILLECTOMY       Social History   Social History     Substance and Sexual Activity   Alcohol Use Not Currently    Comment: quit 20 years ago     Social History     Substance and Sexual Activity   Drug Use Not Currently     Social History     Tobacco Use   Smoking Status Former Smoker    Quit date: 2011    Years since quitting: 10 2   Smokeless Tobacco Never Used     Family History   Problem Relation Age of Onset    Alzheimer's disease Mother     Arthritis Mother        Meds/Allergies     (Not in a hospital admission)      No Known Allergies    Objective     Blood pressure 132/86, pulse 83, temperature 97 5 °F (36 4 °C), temperature source Temporal, resp  rate 18, height 5' 5" (1 651 m), weight 108 kg (238 lb 8 6 oz), SpO2 99 %  PHYSICAL EXAM    Gen: NAD  CV: RRR  CHEST: Clear  ABD: soft, NT/ND  EXT: no edema      ASSESSMENT/PLAN:  This is a 66y o  year old male here for esophagogastroduodenoscopy with possible biopsy and dilation, and he is stable and optimized for his procedure

## 2021-03-18 NOTE — ANESTHESIA PREPROCEDURE EVALUATION
Procedure:  EGD         Physical Exam    Airway    Mallampati score: III  TM Distance: >3 FB  Neck ROM: full     Dental       Cardiovascular  Cardiovascular exam normal    Pulmonary  Pulmonary exam normal     Other Findings      COPD (chronic obstructive pulmonary disease) (HCC)    Hyperlipidemia    Hypertension    AAA (abdominal aortic aneurysm) (HCC)    GERD (gastroesophageal reflux disease)    Hypothyroid    Obesity    Hyperglycemia    Coronary artery disease    Sleep apnea not using prescribed cpap   Gallstone    Colon polyp        1  Gastroesophageal reflux disease without esophagitis     2  Esophageal dysphagia     3  History of colon polyps     Schedule egd with dilation    Anesthesia Plan  ASA Score- 3     Anesthesia Type- IV sedation with anesthesia with ASA Monitors  Additional Monitors:   Airway Plan:           Plan Factors-Exercise tolerance (METS): >4 METS  Chart reviewed  EKG reviewed  Existing labs reviewed  Patient summary reviewed  Induction- intravenous  Postoperative Plan-     Informed Consent- Anesthetic plan and risks discussed with patient  I personally reviewed this patient with the CRNA  Discussed and agreed on the Anesthesia Plan with the CRNA  Vince Jimenez

## 2021-03-18 NOTE — DISCHARGE INSTRUCTIONS
Upper Endoscopy   WHAT YOU NEED TO KNOW:   An upper endoscopy is also called an upper gastrointestinal (GI) endoscopy, or an esophagogastroduodenoscopy (EGD)  You may feel bloated, gassy, or have some abdominal discomfort after your procedure  Your throat may be sore for 24 to 36 hours  You may burp or pass gas from air that is still inside your body  DISCHARGE INSTRUCTIONS:   Call 911 for any of the following:   · You have sudden chest pain or trouble breathing  Seek care immediately if:   · You feel dizzy or faint  · You have trouble swallowing  · Your bowel movements are very dark or black  · Your abdomen is hard and firm and you have severe pain  · You vomit blood  Contact your healthcare provider if:   · You feel full or bloated and cannot burp or pass gas  · You have not had a bowel movement for 3 days after your procedure  · You have neck pain  · You have a fever or chills  · You have nausea or are vomiting  · You have a rash or hives  · You have questions or concerns about your endoscopy  Relieve a sore throat:  Suck on throat lozenges or crushed ice  Gargle with a small amount of warm salt water  Mix 1 teaspoon of salt and 1 cup of warm water to make salt water  Relieve gas and discomfort from bloating:  Lie on your right side with a heating pad on your abdomen  Take short walks to help pass gas  Eat small meals until bloating is relieved  Rest after your procedure: You have been given medicine to relax you  Do not  drive or make important decisions until the day after your procedure  Return to your normal activity as directed  You can usually return to work the day after your procedure  Follow up with your healthcare provider as directed:  Write down your questions so you remember to ask them during your visits     © 2017 [address] is for End User's use only and may not be sold, redistributed or otherwise used for commercial purposes  All illustrations and images included in CareNotes® are the copyrighted property of [employer] D [employer]  or [employer] Del [employer]ario  The above information is an  only  It is not intended as medical advice for individual conditions or treatments  Talk to your doctor, nurse or pharmacist before following any medical regimen to see if it is safe and effective for you

## 2021-03-18 NOTE — ANESTHESIA POSTPROCEDURE EVALUATION
Post-Op Assessment Note    CV Status:  Stable  Pain Score: 0    Pain management: adequate     Mental Status:  Sleepy and arousable   Hydration Status:  Stable   PONV Controlled:  None   Airway Patency:  Patent      Post Op Vitals Reviewed: Yes      Staff: CRNA         No complications documented      BP  121/59    Temp 98 7   Pulse 100   Resp 16   SpO2 94

## 2021-03-23 ENCOUNTER — TELEPHONE (OUTPATIENT)
Dept: GASTROENTEROLOGY | Facility: CLINIC | Age: 71
End: 2021-03-23

## 2021-03-23 NOTE — TELEPHONE ENCOUNTER
----- Message from [employer] Mahesh, DO sent at 3/22/2021  3:09 PM EDT -----  Please call the patient with the biopsy results  Biopsies of the stomach were benign and negative for Helicobacter pylori or cancer

## 2022-04-25 DIAGNOSIS — K21.9 GASTROESOPHAGEAL REFLUX DISEASE WITHOUT ESOPHAGITIS: ICD-10-CM

## 2022-04-25 NOTE — TELEPHONE ENCOUNTER
Dr Vanita Levi - patient called for refill   Pantoprazole  OV is scheduled for 06/08/22 with Dr Vanita Levi

## 2022-04-26 RX ORDER — PANTOPRAZOLE SODIUM 40 MG/1
40 TABLET, DELAYED RELEASE ORAL
Qty: 60 TABLET | Refills: 0 | Status: SHIPPED | OUTPATIENT
Start: 2022-04-26 | End: 2022-06-22

## 2022-06-22 DIAGNOSIS — K21.9 GASTROESOPHAGEAL REFLUX DISEASE WITHOUT ESOPHAGITIS: ICD-10-CM

## 2022-06-22 RX ORDER — PANTOPRAZOLE SODIUM 40 MG/1
TABLET, DELAYED RELEASE ORAL
Qty: 60 TABLET | Refills: 0 | Status: SHIPPED | OUTPATIENT
Start: 2022-06-22

## 2022-07-28 RX ORDER — TIOTROPIUM BROMIDE INHALATION SPRAY 3.12 UG/1
SPRAY, METERED RESPIRATORY (INHALATION) DAILY
COMMUNITY
Start: 2022-03-31

## 2022-07-28 RX ORDER — CHLORAL HYDRATE 500 MG
1 CAPSULE ORAL 2 TIMES DAILY
COMMUNITY
Start: 2022-05-26 | End: 2023-05-26

## 2022-07-28 RX ORDER — ISOSORBIDE MONONITRATE 30 MG/1
30 TABLET, EXTENDED RELEASE ORAL DAILY
COMMUNITY
Start: 2021-01-25

## 2022-07-29 ENCOUNTER — OFFICE VISIT (OUTPATIENT)
Dept: GASTROENTEROLOGY | Facility: CLINIC | Age: 72
End: 2022-07-29
Payer: MEDICARE

## 2022-07-29 VITALS
SYSTOLIC BLOOD PRESSURE: 112 MMHG | OXYGEN SATURATION: 94 % | HEART RATE: 86 BPM | WEIGHT: 237.8 LBS | HEIGHT: 65 IN | BODY MASS INDEX: 39.62 KG/M2 | DIASTOLIC BLOOD PRESSURE: 60 MMHG

## 2022-07-29 DIAGNOSIS — Z86.010 HISTORY OF COLON POLYPS: ICD-10-CM

## 2022-07-29 DIAGNOSIS — K21.9 GASTROESOPHAGEAL REFLUX DISEASE WITHOUT ESOPHAGITIS: Primary | ICD-10-CM

## 2022-07-29 DIAGNOSIS — K57.90 DIVERTICULAR DISEASE: ICD-10-CM

## 2022-07-29 PROCEDURE — 99214 OFFICE O/P EST MOD 30 MIN: CPT | Performed by: INTERNAL MEDICINE

## 2022-07-29 RX ORDER — PANTOPRAZOLE SODIUM 40 MG/1
40 TABLET, DELAYED RELEASE ORAL DAILY
Qty: 93 TABLET | Refills: 3 | Status: SHIPPED | OUTPATIENT
Start: 2022-07-29

## 2022-07-29 NOTE — PROGRESS NOTES
[facility] - Outpatient Follow-up Note  Noel Paulson 74 y o  male MRN: [id number]  Encounter: [id number]          ASSESSMENT AND PLAN:      1  Gastroesophageal reflux disease without esophagitis  - pantoprazole (PROTONIX) 40 mg tablet; Take 1 tablet (40 mg total) by mouth daily  Dispense: 93 tablet; Refill: 3    2  Diverticular disease  - Colonoscopy; Future    3  History of colon polyps  - Colonoscopy; Future    4  History of difficult peripheral IV access problems    ______________________________________________________________________    SUBJECTIVE:  This 28-year-old male comes the office today for the scheduling of a colonoscopy  His last colonoscopy was performed several years ago  He states that he has been feeling well  He denies any problems with bowel movement  His bowel movements on a daily basis  He denies any diarrhea or constipation, nausea vomiting, heartburn, dysphagia, odynophagia  He denies rectal bleeding, melena, hematemesis  There is no family history for colon cancer, esophageal cancer, or stomach cancer  He states that he does have a problem with IV access  In the past it has been very challenging to find veins in his arms due to the fact that he was a previous IV drug user  He states that he also needs a refill on pantoprazole  He was requesting a 3 month rather than a single month supply  The pharmacy to utilize is in [place]  REVIEW OF SYSTEMS IS OTHERWISE NEGATIVE        Historical Information   Past Medical History:   Diagnosis Date    AAA (abdominal aortic aneurysm) (HCC)     Colon polyp     COPD (chronic obstructive pulmonary disease) (HCC)     Coronary artery disease     Gallstone     GERD (gastroesophageal reflux disease)     Hyperglycemia     Hyperlipidemia     Hypertension     Hypothyroid     Obesity     Sleep apnea     not using prescribed cpap     Past Surgical History:   Procedure Laterality Date    CATARACT EXTRACTION  CHOLECYSTECTOMY      CORONARY ARTERY BYPASS GRAFT      HERNIA REPAIR      QUADRICEPSPLASTY  2011    TONSILLECTOMY       Social History   Social History     Substance and Sexual Activity   Alcohol Use Not Currently    Comment: quit 20 years ago     Social History     Substance and Sexual Activity   Drug Use Not Currently     Social History     Tobacco Use   Smoking Status Former Smoker    Quit date:     Years since quittin 5   Smokeless Tobacco Never Used     Family History   Problem Relation Age of Onset    Alzheimer's disease Mother     Arthritis Mother        Meds/Allergies       Current Outpatient Medications:     albuterol (PROVENTIL HFA,VENTOLIN HFA) 90 mcg/act inhaler    aspirin 81 MG tablet    atorvastatin (LIPITOR) 40 mg tablet    carvedilol (COREG) 6 25 mg tablet    Cholecalciferol 50 MCG ( UT) CAPS    clopidogrel (PLAVIX) 75 mg tablet    isosorbide mononitrate (IMDUR) 30 mg 24 hr tablet    levothyroxine 50 mcg tablet    nitroglycerin (NITROSTAT) 0 4 mg SL tablet    Omega-3 Fatty Acids (fish oil) 1,000 mg    pantoprazole (PROTONIX) 40 mg tablet    pantoprazole (PROTONIX) 40 mg tablet    sitaGLIPtin (JANUVIA) 50 mg tablet    tiotropium (Spiriva Respimat) 2 5 MCG/ACT AERS inhaler    famotidine (PEPCID) 20 mg tablet    isosorbide dinitrate (ISORDIL) 30 mg tablet    No Known Allergies        Objective     Blood pressure 112/60, pulse 86, height 5' 5" (1 651 m), weight 108 kg (237 lb 12 8 oz), SpO2 94 %  Body mass index is 39 57 kg/m²  PHYSICAL EXAM:      General Appearance:   Alert, cooperative, no distress   HEENT:   Normocephalic, atraumatic, anicteric      Neck:  Supple, symmetrical, trachea midline   Lungs:   Clear to auscultation bilaterally; no rales, rhonchi or wheezing; respirations unlabored    Heart[de-identified]   Regular rate and rhythm; no murmur, rub, or gallop     Abdomen:   Soft, non-tender, non-distended; normal bowel sounds; no masses, no organomegaly  Genitalia:   Deferred    Rectal:   Deferred    Extremities:  No cyanosis, clubbing or edema    Pulses:  2+ and symmetric    Skin:  No jaundice, rashes, or lesions    Lymph nodes:  No palpable cervical lymphadenopathy        Lab Results:   No visits with results within 1 Day(s) from this visit  Latest known visit with results is:   Hospital Outpatient Visit on 03/18/2021   Component Date Value    Case Report 03/18/2021                      Value:Surgical Pathology Report                         Case: [id number]                                   Authorizing Provider:  Jose Mullen DO          Collected:           03/18/2021 1239              Ordering Location[facility]      [facility]       Received:            03/18/2021 [address] End[facility]                                                             Pathologist:           Becky Du MD                                                        Specimen:    Stomach, antrum                                                                            Final Diagnosis 03/18/2021                      Value: This result contains rich text formatting which cannot be displayed here   Additional Information 03/18/2021                      Value: This result contains rich text formatting which cannot be displayed here  Kisha Mare Gross Description 03/18/2021                      Value: This result contains rich text formatting which cannot be displayed here   Clinical Information 03/18/2021                      Value:Cold bx r/o H Pylori         Radiology Results:   No results found

## 2022-07-29 NOTE — LETTER
August 2, 2022     Carmen Chavez MD  [address] [facility] [address]    Patient: Jimbo Jeffery   YOB: 1950   Date of Visit: 7/29/2022       Dear Dr Winsome Shrestha:    Thank you for referring Jimbo Jeffery to me for evaluation  Below are my notes for this consultation  If you have questions, please do not hesitate to call me  I look forward to following your patient along with you  Sincerely,        Erlin Perera DO        CC: No Recipients  Erlin Perera DO  7/29/2022 11:38 AM  Signed  [facility] - Outpatient Follow-up Note  Jimbo Jeffery 74 y o  male MRN: [id number]  Encounter: [id number]          ASSESSMENT AND PLAN:      1  Gastroesophageal reflux disease without esophagitis  - pantoprazole (PROTONIX) 40 mg tablet; Take 1 tablet (40 mg total) by mouth daily  Dispense: 93 tablet; Refill: 3    2  Diverticular disease  - Colonoscopy; Future    3  History of colon polyps  - Colonoscopy; Future    4  History of difficult peripheral IV access problems    ______________________________________________________________________    SUBJECTIVE:  This 78-year-old male comes the office today for the scheduling of a colonoscopy  His last colonoscopy was performed several years ago  He states that he has been feeling well  He denies any problems with bowel movement  His bowel movements on a daily basis  He denies any diarrhea or constipation, nausea vomiting, heartburn, dysphagia, odynophagia  He denies rectal bleeding, melena, hematemesis  There is no family history for colon cancer, esophageal cancer, or stomach cancer  He states that he does have a problem with IV access  In the past it has been very challenging to find veins in his arms due to the fact that he was a previous IV drug user  He states that he also needs a refill on pantoprazole  He was requesting a 3 month rather than a single month supply  The pharmacy to utilize is in [facility] OF SYSTEMS IS OTHERWISE NEGATIVE        Historical Information   Past Medical History:   Diagnosis Date    AAA (abdominal aortic aneurysm) ([address] )     Colon polyp     COPD (chronic obstructive pulmonary disease) (HCC)     Coronary artery disease     Gallstone     GERD (gastroesophageal reflux disease)     Hyperglycemia     Hyperlipidemia     Hypertension     Hypothyroid     Obesity     Sleep apnea     not using prescribed cpap     Past Surgical History:   Procedure Laterality Date    CATARACT EXTRACTION      CHOLECYSTECTOMY      CORONARY ARTERY BYPASS GRAFT      HERNIA REPAIR      QUADRICEPSPLASTY      TONSILLECTOMY       Social History   Social History     Substance and Sexual Activity   Alcohol Use Not Currently    Comment: quit 20 years ago     Social History     Substance and Sexual Activity   Drug Use Not Currently     Social History     Tobacco Use   Smoking Status Former Smoker    Quit date:     Years since quittin 5   Smokeless Tobacco Never Used     Family History   Problem Relation Age of Onset    Alzheimer's disease Mother     Arthritis Mother        Meds/Allergies       Current Outpatient Medications:     albuterol (PROVENTIL HFA,VENTOLIN HFA) 90 mcg/act inhaler    aspirin 81 MG tablet    atorvastatin (LIPITOR) 40 mg tablet    carvedilol (COREG) 6 25 mg tablet    Cholecalciferol 50 MCG ( [place]) CAPS    clopidogrel (PLAVIX) 75 mg tablet    isosorbide mononitrate (IMDUR) 30 mg 24 hr tablet    levothyroxine 50 mcg tablet    nitroglycerin (NITROSTAT) 0 4 mg SL tablet    Omega-3 Fatty Acids (fish oil) 1,000 mg    pantoprazole (PROTONIX) 40 mg tablet    pantoprazole (PROTONIX) 40 mg tablet    sitaGLIPtin (JANUVIA) 50 mg tablet    tiotropium (Spiriva Respimat) 2 5 MCG/ACT AERS inhaler    famotidine (PEPCID) 20 mg tablet    isosorbide dinitrate (ISORDIL) 30 mg tablet    No Known Allergies        Objective     Blood pressure 112/60, pulse 86, height 5' 5" (1 651 m), weight 108 kg (237 lb 12 8 oz), SpO2 94 %  Body mass index is 39 57 kg/m²  PHYSICAL EXAM:      General Appearance:   Alert, cooperative, no distress   HEENT:   Normocephalic, atraumatic, anicteric      Neck:  Supple, symmetrical, trachea midline   Lungs:   Clear to auscultation bilaterally; no rales, rhonchi or wheezing; respirations unlabored    Heart[de-identified]   Regular rate and rhythm; no murmur, rub, or gallop  Abdomen:   Soft, non-tender, non-distended; normal bowel sounds; no masses, no organomegaly    Genitalia:   Deferred    Rectal:   Deferred    Extremities:  No cyanosis, clubbing or edema    Pulses:  2+ and symmetric    Skin:  No jaundice, rashes, or lesions    Lymph nodes:  No palpable cervical lymphadenopathy        Lab Results:   No visits with results within 1 Day(s) from this visit  Latest known visit with results is:   Hospital Outpatient Visit on 03/18/2021   Component Date Value    Case Report 03/18/2021                      Value:Surgical Pathology Report                         Case: [id number]                                   Authorizing Provider:  Mason Callow, DO          Collected:           03/18/2021 1239              Ordering Location:      [facility]       Received:            03/18/2021 [address] [facility]                                                             Pathologist:           Trisha Urbina MD                                                        Specimen:    Stomach, antrum                                                                            Final Diagnosis 03/18/2021                      Value: This result contains rich text formatting which cannot be displayed here   Additional Information 03/18/2021                      Value: This result contains rich text formatting which cannot be displayed here  [facility] Gross Description 03/18/2021                      Value: This result contains rich text formatting which cannot be displayed here   Clinical Information 03/18/2021                      Value:Cold bx r/o H Pylori         Radiology Results:   No results found

## 2022-07-29 NOTE — PATIENT INSTRUCTIONS
Scheduled date of colonoscopy (as of today):10/4/22  Physician performing colonoscopy:kierra  Location of colonoscopy:[place]  Bowel prep reviewed with patient:mag cit/miralax  Instructions reviewed with patient by:malgorzata lindquist  Clearances:  none

## 2022-10-03 ENCOUNTER — TELEPHONE (OUTPATIENT)
Dept: GASTROENTEROLOGY | Facility: AMBULARY SURGERY CENTER | Age: 72
End: 2022-10-03

## 2022-10-03 NOTE — TELEPHONE ENCOUNTER
Patients GI provider:  Dr. Marquez    Number to return call: ( [phone]    Reason for call: Pt calling to have prep instructions emailed to him at [email or web address], went over dulcolax/miralax    Scheduled procedure/appointment date if applicable: Apt/procedure   10-4-22

## 2022-10-04 ENCOUNTER — HOSPITAL ENCOUNTER (OUTPATIENT)
Dept: GASTROENTEROLOGY | Facility: HOSPITAL | Age: 72
Setting detail: OUTPATIENT SURGERY
Discharge: HOME | End: 2022-10-04
Attending: INTERNAL MEDICINE
Payer: MEDICARE

## 2022-10-04 ENCOUNTER — ANESTHESIA (OUTPATIENT)
Dept: GASTROENTEROLOGY | Facility: HOSPITAL | Age: 72
End: 2022-10-04

## 2022-10-04 ENCOUNTER — ANESTHESIA EVENT (OUTPATIENT)
Dept: GASTROENTEROLOGY | Facility: HOSPITAL | Age: 72
End: 2022-10-04

## 2022-10-04 VITALS
WEIGHT: 236.77 LBS | HEART RATE: 75 BPM | DIASTOLIC BLOOD PRESSURE: 73 MMHG | OXYGEN SATURATION: 93 % | HEIGHT: 65 IN | RESPIRATION RATE: 22 BRPM | SYSTOLIC BLOOD PRESSURE: 128 MMHG | TEMPERATURE: 98.1 F | BODY MASS INDEX: 39.45 KG/M2

## 2022-10-04 DIAGNOSIS — Z86.010 HISTORY OF COLON POLYPS: ICD-10-CM

## 2022-10-04 DIAGNOSIS — K57.90 DIVERTICULAR DISEASE: ICD-10-CM

## 2022-10-04 PROBLEM — E03.4 HYPOTHYROIDISM DUE TO ACQUIRED ATROPHY OF THYROID: Status: ACTIVE | Noted: 2018-10-18

## 2022-10-04 PROBLEM — E11.9 TYPE 2 DIABETES MELLITUS WITHOUT COMPLICATION, WITHOUT LONG-TERM CURRENT USE OF INSULIN (HCC): Status: ACTIVE | Noted: 2021-04-14

## 2022-10-04 PROBLEM — K21.9 GASTROESOPHAGEAL REFLUX DISEASE WITHOUT ESOPHAGITIS: Status: ACTIVE | Noted: 2019-01-23

## 2022-10-04 PROBLEM — I71.40 ABDOMINAL AORTIC ANEURYSM (AAA) 3.0 CM TO 5.5 CM IN DIAMETER IN MALE: Status: ACTIVE | Noted: 2018-12-10

## 2022-10-04 PROBLEM — Z87.891 FORMER SMOKER: Status: ACTIVE | Noted: 2018-01-18

## 2022-10-04 LAB — GLUCOSE SERPL-MCNC: 161 MG/DL (ref 65–140)

## 2022-10-04 PROCEDURE — 45380 COLONOSCOPY AND BIOPSY: CPT | Performed by: INTERNAL MEDICINE

## 2022-10-04 PROCEDURE — 82948 REAGENT STRIP/BLOOD GLUCOSE: CPT

## 2022-10-04 PROCEDURE — 88305 TISSUE EXAM BY PATHOLOGIST: CPT | Performed by: PATHOLOGY

## 2022-10-04 RX ORDER — SODIUM CHLORIDE, SODIUM LACTATE, POTASSIUM CHLORIDE, CALCIUM CHLORIDE 600; 310; 30; 20 MG/100ML; MG/100ML; MG/100ML; MG/100ML
125 INJECTION, SOLUTION INTRAVENOUS CONTINUOUS
Status: DISCONTINUED | OUTPATIENT
Start: 2022-10-04 | End: 2022-10-08 | Stop reason: HOSPADM

## 2022-10-04 RX ORDER — LIDOCAINE HYDROCHLORIDE 10 MG/ML
0.5 INJECTION, SOLUTION EPIDURAL; INFILTRATION; INTRACAUDAL; PERINEURAL ONCE AS NEEDED
Status: DISCONTINUED | OUTPATIENT
Start: 2022-10-04 | End: 2022-10-08 | Stop reason: HOSPADM

## 2022-10-04 RX ORDER — PROPOFOL 10 MG/ML
INJECTION, EMULSION INTRAVENOUS AS NEEDED
Status: DISCONTINUED | OUTPATIENT
Start: 2022-10-04 | End: 2022-10-04

## 2022-10-04 RX ORDER — SODIUM CHLORIDE, SODIUM LACTATE, POTASSIUM CHLORIDE, CALCIUM CHLORIDE 600; 310; 30; 20 MG/100ML; MG/100ML; MG/100ML; MG/100ML
INJECTION, SOLUTION INTRAVENOUS CONTINUOUS PRN
Status: DISCONTINUED | OUTPATIENT
Start: 2022-10-04 | End: 2022-10-04

## 2022-10-04 RX ADMIN — PROPOFOL 200 MG: 10 INJECTION, EMULSION INTRAVENOUS at 09:41

## 2022-10-04 RX ADMIN — SODIUM CHLORIDE, SODIUM LACTATE, POTASSIUM CHLORIDE, AND CALCIUM CHLORIDE: .6; .31; .03; .02 INJECTION, SOLUTION INTRAVENOUS at 09:35

## 2022-10-04 NOTE — H&P
History and Physical - [facility]  [facility] 74 y o  male MRN: [id number]      HPI: [facility] is a 74y o  year old male who presents for for evaluation of diverticular disease and history of polyps      REVIEW OF SYSTEMS: Per the HPI, and otherwise unremarkable  Historical Information   Past Medical History:   Diagnosis Date    AAA (abdominal aortic aneurysm)     Colon polyp     COPD (chronic obstructive pulmonary disease) (HCC)     Coronary artery disease     Gallstone     GERD (gastroesophageal reflux disease)     Hyperglycemia     Hyperlipidemia     Hypertension     Hypothyroid     Obesity     Sleep apnea     not using prescribed cpap     Past Surgical History:   Procedure Laterality Date    CATARACT EXTRACTION      CHOLECYSTECTOMY      CORONARY ARTERY BYPASS GRAFT      HERNIA REPAIR      QUADRICEPSPLASTY  2011    TONSILLECTOMY       Social History   Social History     Substance and Sexual Activity   Alcohol Use Not Currently    Comment: quit 20 years ago     Social History     Substance and Sexual Activity   Drug Use Not Currently     Social History     Tobacco Use   Smoking Status Former Smoker    Quit date:     Years since quittin 7   Smokeless Tobacco Never Used     Family History   Problem Relation Age of Onset    Alzheimer's disease Mother     Arthritis Mother        Meds/Allergies     (Not in a hospital admission)      No Known Allergies    Objective     Blood pressure 139/81, pulse 83, temperature (!) 97 3 °F (36 3 °C), temperature source Temporal, resp  rate 20, height 5' 5" (1 651 m), weight 107 kg (236 lb 12 4 oz), SpO2 94 %  PHYSICAL EXAM    Gen: NAD  CV: RRR  CHEST: Clear  ABD: soft, NT/ND  EXT: no edema      ASSESSMENT/PLAN:  This is a 74y o  year old male here for colonoscopy, and he is stable and optimized for his procedure

## 2022-10-04 NOTE — ANESTHESIA POSTPROCEDURE EVALUATION
Post-Op Assessment Note    CV Status:  Stable  Pain Score: 0    Pain management: adequate     Mental Status:  Sleepy   Hydration Status:  Stable   PONV Controlled:  Controlled      Post Op Vitals Reviewed: Yes      Staff: Anesthesiologist, CRNA         No complications documented      /70 (10/04/22 0955)    Temp     Pulse 85 (10/04/22 0955)   Resp 17 (10/04/22 0955)    SpO2 93 % (10/04/22 0955)

## 2022-10-04 NOTE — ANESTHESIA PREPROCEDURE EVALUATION
Procedure:  COLONOSCOPY    Relevant Problems   CARDIO   (+) Abdominal aortic aneurysm (AAA) 3 0 cm to 5 5 cm in diameter in male   (+) CAD (coronary artery disease)   (+) HTN (hypertension)   (+) History of coronary artery bypass graft   (+) Hyperlipidemia      ENDO   (+) Hypothyroidism due to acquired atrophy of thyroid   (+) Type 2 diabetes mellitus without complication, without long-term current use of insulin (HCC)      GI/HEPATIC   (+) Gastroesophageal reflux disease without esophagitis      PULMONARY   (+) Chronic obstructive pulmonary disease (HCC)   (+) Sleep apnea      Other   (+) Former smoker   CT a/p 4/2022:   1  Fusiform aneurysmal dilation of infrarenal abdominal aorta which treated with endovascular stent graft with no evidence of endoleak    NST 3/2022:  FINDINGS: There are no fixed or reversible perfusion defects in the left   ventricle to suggest infarct or ischemia  There are no wall motion   abnormalities  The left ventricular ejection fraction is 58%  [facility] 9/2021:  Patent stent to left main   The stent extends into the LAD  Atretic LIMA to LAD seen  Competitive flow is seen in OM branch  This is supplied by free SANTO  Prior to the left main stenting-severe left main disease was seen  LIMA to LAD and SANTO to OM branch are now redundant  Likely occluded graft to RPL  From previous cath the native vessel was very small small size  RCA is extensively stented  Proximal RCA stent is patent with minimal disease   RPDA stent is patent with missing minimal disease     Mid RCA stent shows significant stenosis along with stenosis just prior to   the stent  PCI of this was done with VANDANA  Physical Exam    Airway    Mallampati score: II  TM Distance: >3 FB  Neck ROM: full     Dental       Cardiovascular      Pulmonary      Other Findings        Anesthesia Plan  ASA Score- 3     Anesthesia Type- IV sedation with anesthesia with ASA Monitors           Additional Monitors: Airway Plan:           Plan Factors-Exercise tolerance (METS): <4 METS  Chart reviewed  EKG reviewed  Patient summary reviewed  Induction- intravenous  Postoperative Plan-     Informed Consent- Anesthetic plan and risks discussed with patient  I personally reviewed this patient with the CRNA  Discussed and agreed on the Anesthesia Plan with the LARA Olivas

## 2022-10-12 PROCEDURE — 88305 TISSUE EXAM BY PATHOLOGIST: CPT | Performed by: PATHOLOGY

## 2022-10-13 ENCOUNTER — TELEPHONE (OUTPATIENT)
Dept: GASTROENTEROLOGY | Facility: CLINIC | Age: 72
End: 2022-10-13

## 2022-10-13 NOTE — TELEPHONE ENCOUNTER
----- Message from Ilan Neal DO sent at 10/12/2022  5:51 PM EDT -----  The please call the patient with the polyp results  The polyp was a precancerous polyp and completely removed    The patient is due for repeat colonoscopy in 5 years

## 2022-10-13 NOTE — TELEPHONE ENCOUNTER
Called pt and advised  Pt expressed his extreme gratitude to all of the staff and to Dr Anita Dueñas

## 2022-12-30 RX ORDER — SITAGLIPTIN AND METFORMIN HYDROCHLORIDE 1000; 50 MG/1; MG/1
1 TABLET, FILM COATED, EXTENDED RELEASE ORAL DAILY
COMMUNITY
Start: 2022-10-20

## 2023-01-04 ENCOUNTER — OFFICE VISIT (OUTPATIENT)
Dept: GASTROENTEROLOGY | Facility: CLINIC | Age: 73
End: 2023-01-04

## 2023-01-04 VITALS
WEIGHT: 230.8 LBS | HEIGHT: 65 IN | HEART RATE: 74 BPM | DIASTOLIC BLOOD PRESSURE: 60 MMHG | BODY MASS INDEX: 38.45 KG/M2 | OXYGEN SATURATION: 98 % | SYSTOLIC BLOOD PRESSURE: 104 MMHG

## 2023-01-04 DIAGNOSIS — E66.01 MORBID OBESITY WITH BMI OF 40.0-44.9, ADULT (HCC): ICD-10-CM

## 2023-01-04 DIAGNOSIS — K21.9 GASTROESOPHAGEAL REFLUX DISEASE WITHOUT ESOPHAGITIS: Primary | ICD-10-CM

## 2023-01-04 RX ORDER — PANTOPRAZOLE SODIUM 40 MG/1
40 TABLET, DELAYED RELEASE ORAL DAILY
Qty: 31 TABLET | Refills: 11 | Status: SHIPPED | OUTPATIENT
Start: 2023-01-04

## 2023-01-04 NOTE — PROGRESS NOTES
[facility] - Outpatient Follow-up Note  [facility] 74 y o  male MRN: [id number]  Encounter: [id number]          ASSESSMENT AND PLAN:      1  Gastroesophageal reflux disease without esophagitis  - pantoprazole (PROTONIX) 40 mg tablet; Take 1 tablet (40 mg total) by mouth daily  Dispense: 31 tablet; Refill: 11    2  Morbid obesity with BMI of 40 0-44 9, adult [facility])    Patient should continue taking pantoprazole 40 mg first thing in the morning at least a half an hour to 45 minutes before breakfast    Patient was advised to not be eating or drinking any food within 2 hours of lying down    There is no indication for either esophagogastroduodenoscopy or colonoscopy at this time    He was encouraged to begin exercising at least 3-4 times weekly which can begin with walking ______________________________________________________________________    SUBJECTIVE: I was a comes the office today for routine follow-up  He states that his gastroesophageal reflux disease is under good control as long as he takes pantoprazole 40 mg daily in the morning time, typically about 45 minutes before breakfast   He denies any dysphagia or odynophagia  He does admit to weight gain of 40 pounds over the past 3 years  He states that he does not exercise much  He denies any constipation or diarrhea  He further denies any rectal bleeding, hematemesis, melena  He states that he was giving his wife some of his pantoprazole so that she could better relieve the symptoms of her acid reflux  REVIEW OF SYSTEMS IS OTHERWISE NEGATIVE        Historical Information   Past Medical History:   Diagnosis Date   • AAA (abdominal aortic aneurysm)    • Colon polyp    • COPD (chronic obstructive pulmonary disease) (HCC)    • Coronary artery disease    • Gallstone    • GERD (gastroesophageal reflux disease)    • Hyperglycemia    • Hyperlipidemia    • Hypertension    • Hypothyroid    • Obesity    • Sleep apnea     not using prescribed cpap     Past Surgical History:   Procedure Laterality Date   • ABDOMINAL AORTIC ANEURYSM REPAIR, ENDOVASCULAR      Stent    • CATARACT EXTRACTION     • CHOLECYSTECTOMY     • CORONARY ARTERY BYPASS GRAFT     • HERNIA REPAIR     • QUADRICEPSPLASTY     • TONSILLECTOMY       Social History   Social History     Substance and Sexual Activity   Alcohol Use Not Currently    Comment: quit 20 years ago     Social History     Substance and Sexual Activity   Drug Use Not Currently     Social History     Tobacco Use   Smoking Status Former   • Types: Cigarettes   • Quit date:    • Years since quittin 0   Smokeless Tobacco Never     Family History   Problem Relation Age of Onset   • Alzheimer's disease Mother    • Arthritis Mother        Meds/Allergies       Current Outpatient Medications:   •  albuterol (PROVENTIL HFA,VENTOLIN HFA) 90 mcg/act inhaler  •  aspirin 81 MG tablet  •  atorvastatin (LIPITOR) 40 mg tablet  •  carvedilol (COREG) 6 25 mg tablet  •  Cholecalciferol 50 MCG (2000) CAPS  •  clopidogrel (PLAVIX) 75 mg tablet  •  Empagliflozin 25 MG TABS  •  famotidine (PEPCID) 20 mg tablet  •  isosorbide dinitrate (ISORDIL) 30 mg tablet  •  isosorbide mononitrate (IMDUR) 30 mg 24 hr tablet  •  levothyroxine 50 mcg tablet  •  nitroglycerin (NITROSTAT) 0 4 mg SL tablet  •  Omega-3 Fatty Acids (fish oil) 1,000 mg  •  pantoprazole (PROTONIX) 40 mg tablet  •  pantoprazole (PROTONIX) 40 mg tablet  •  SITagliptin-metFORMIN HCl ER (Janumet XR)  MG TB24  •  tiotropium (Spiriva Respimat) 2 5 MCG/ACT AERS inhaler    No Known Allergies        Objective     Blood pressure 104/60, pulse 74, height 5' 5" (1 651 m), weight 105 kg (230 lb 12 8 oz), SpO2 98 %  Body mass index is 38 41 kg/m²        PHYSICAL EXAM:      General Appearance:   Alert, cooperative, no distress   HEENT:   Normocephalic, atraumatic, anicteric      Neck:  Supple, symmetrical, trachea midline   Lungs:   Clear to auscultation bilaterally; no rales, rhonchi or wheezing; respirations unlabored    Heart[de-identified]   Regular rate and rhythm; no murmur, rub, or gallop  Abdomen:   Soft, non-tender, non-distended; normal bowel sounds; no masses, no organomegaly    Genitalia:   Deferred    Rectal:   Deferred    Extremities:  No cyanosis, clubbing or edema    Pulses:  2+ and symmetric    Skin:  No jaundice, rashes, or lesions    Lymph nodes:  No palpable cervical lymphadenopathy        Lab Results:   No visits with results within 1 Day(s) from this visit  Latest known visit with results is:   Hospital Outpatient Visit on 10/04/2022   Component Date Value   • POC Glucose 10/04/2022 161 (H)    • Case Report 10/04/2022                      Value:Surgical Pathology Report                         Case: [id number]                                   Authorizing Provider:  Lisa Kim DO          Collected:           10/04/2022 8016              Ordering Location:      [facility]       Received:            10/04/2022 [facility] Endoscopy                                                             Pathologist:           Love Sellers MD                                                          Specimen:    Polyp, Colorectal, cecum                                                                  • Final Diagnosis 10/04/2022                      Value: This result contains rich text formatting which cannot be displayed here  • Additional Information 10/04/2022                      Value: This result contains rich text formatting which cannot be displayed here  • Synoptic Checklist 10/04/2022                      Value:                            COLON/RECTUM POLYP FORM - GI - All Specimens                                                                                     :    Adenoma(s)                                                         :     Other     • Gross Description 10/04/2022 Value:This result contains rich text formatting which cannot be displayed here  • Clinical Information 10/04/2022                      Value:Cold bx polypectomy         Radiology Results:   No results found  Answers for HPI/ROS submitted by the patient on 12/28/2022  Frequency: rarely  Pain - numeric: 0/10  anorexia: No  arthralgias: Yes  belching: No  constipation: No  diarrhea: Yes  dysuria: No  fever: No  flatus: Yes  frequency: Yes  headaches: No  hematochezia: No  hematuria: No  melena: No  myalgias: Yes  nausea:  No  weight loss: No  vomiting: No  Aggravated by: nothing  Relieved by: activity  Diagnostic workup: upper endoscopy

## 2023-07-05 ENCOUNTER — OFFICE VISIT (OUTPATIENT)
Dept: GASTROENTEROLOGY | Facility: CLINIC | Age: 73
End: 2023-07-05
Payer: MEDICARE

## 2023-07-05 VITALS
WEIGHT: 220.8 LBS | SYSTOLIC BLOOD PRESSURE: 120 MMHG | OXYGEN SATURATION: 95 % | HEART RATE: 82 BPM | HEIGHT: 65 IN | DIASTOLIC BLOOD PRESSURE: 62 MMHG | BODY MASS INDEX: 36.79 KG/M2

## 2023-07-05 DIAGNOSIS — Z86.010 HISTORY OF COLON POLYPS: ICD-10-CM

## 2023-07-05 DIAGNOSIS — K21.9 GASTROESOPHAGEAL REFLUX DISEASE WITHOUT ESOPHAGITIS: Primary | ICD-10-CM

## 2023-07-05 PROCEDURE — 99214 OFFICE O/P EST MOD 30 MIN: CPT | Performed by: INTERNAL MEDICINE

## 2023-07-05 RX ORDER — PANTOPRAZOLE SODIUM 40 MG/1
40 TABLET, DELAYED RELEASE ORAL DAILY
Qty: 31 TABLET | Refills: 6 | Status: SHIPPED | OUTPATIENT
Start: 2023-07-05

## 2024-01-10 ENCOUNTER — OFFICE VISIT (OUTPATIENT)
Dept: GASTROENTEROLOGY | Facility: CLINIC | Age: 74
End: 2024-01-10
Payer: MEDICARE

## 2024-01-10 VITALS
HEART RATE: 90 BPM | WEIGHT: 221.2 LBS | HEIGHT: 66 IN | SYSTOLIC BLOOD PRESSURE: 137 MMHG | DIASTOLIC BLOOD PRESSURE: 89 MMHG | OXYGEN SATURATION: 95 % | BODY MASS INDEX: 35.55 KG/M2

## 2024-01-10 DIAGNOSIS — K21.9 GASTROESOPHAGEAL REFLUX DISEASE WITHOUT ESOPHAGITIS: Primary | ICD-10-CM

## 2024-01-10 DIAGNOSIS — K57.90 DIVERTICULAR DISEASE: ICD-10-CM

## 2024-01-10 DIAGNOSIS — E66.01 MORBID OBESITY WITH BMI OF 40.0-44.9, ADULT (HCC): ICD-10-CM

## 2024-01-10 PROCEDURE — 99214 OFFICE O/P EST MOD 30 MIN: CPT | Performed by: INTERNAL MEDICINE

## 2024-01-10 RX ORDER — PANTOPRAZOLE SODIUM 40 MG/1
40 TABLET, DELAYED RELEASE ORAL DAILY
Qty: 31 TABLET | Refills: 11 | Status: SHIPPED | OUTPATIENT
Start: 2024-01-10

## 2024-01-10 RX ORDER — IPRATROPIUM BROMIDE 42 UG/1
SPRAY, METERED NASAL
COMMUNITY
Start: 2024-01-06

## 2024-01-10 NOTE — PROGRESS NOTES
[facility] - Outpatient Follow-up Note  Franki Benoit 73 y.o. male MRN: [id number]  Encounter: [id number]          ASSESSMENT AND PLAN:      1. Gastroesophageal reflux disease without esophagitis  - pantoprazole (PROTONIX) 40 mg tablet; Take 1 tablet (40 mg total) by mouth daily  Dispense: 31 tablet; Refill: 11    2. Morbid obesity with BMI of 40.0-44.9, adult (HCC)    3. Diverticular disease    4.  Diarrhea, intermittent  -Continue high-fiber diet  -No additional workup warranted at this time    5.  History of colon polyp  -His next and final colonoscopy will be in 2029 at age 78    ______________________________________________________________________    SUBJECTIVE: Claudio comes to the office today for routine follow-up.  He states that he has been feeling well.  He is well aware that he has a hiatal hernia that was diagnosed by endoscopy back in March 18, 2021.  He states that his heartburn symptoms are under good control as long as he takes pantoprazole 40 mg in the morning time half an hour before breakfast.  He also states that stopping eating or snacking late at night has been very beneficial to the improvement of his gastroesophageal reflux disease.  He had undergone a barium swallow back in 2022 which demonstrated evidence of regurgitation.  He denies any rectal bleeding.  He denied bloating or gaseousness.  He denies constipation but does admit to an episode of diarrhea 1 time a day every 2 weeks.  He was not able to identify any food substances seem to provoke him to develop the diarrhea.  He states that he would like a refill of his medication.  His last colonoscopy was performed October 4, 2022 and that showed diverticulosis in the left colon as well as a single polyp which turned out to be an adenoma.      REVIEW OF SYSTEMS IS OTHERWISE NEGATIVE.      Historical Information   Past Medical History:   Diagnosis Date    AAA (abdominal aortic aneurysm) (HCC)     Colon polyp     COPD  "(chronic obstructive pulmonary disease) (HCC)     Coronary artery disease     Gallstone     GERD (gastroesophageal reflux disease)     Hyperglycemia     Hyperlipidemia     Hypertension     Hypothyroid     Obesity     Sleep apnea     not using prescribed cpap     Past Surgical History:   Procedure Laterality Date    ABDOMINAL AORTIC ANEURYSM REPAIR, ENDOVASCULAR      Stent     CATARACT EXTRACTION      CHOLECYSTECTOMY      CORONARY ARTERY BYPASS GRAFT      HERNIA REPAIR      QUADRICEPSPLASTY  2011    TONSILLECTOMY       Social History   Social History     Substance and Sexual Activity   Alcohol Use Not Currently    Comment: quit 20 years ago     Social History     Substance and Sexual Activity   Drug Use Not Currently     Social History     Tobacco Use   Smoking Status Former    Current packs/day: 0.00    Types: Cigarettes    Quit date:     Years since quittin.0   Smokeless Tobacco Never     Family History   Problem Relation Age of Onset    Alzheimer's disease Mother     Arthritis Mother        Meds/Allergies       Current Outpatient Medications:     albuterol (PROVENTIL HFA,VENTOLIN HFA) 90 mcg/act inhaler    aspirin 81 MG tablet    atorvastatin (LIPITOR) 40 mg tablet    carvedilol (COREG) 6.25 mg tablet    Cholecalciferol 50 MCG (2000) CAPS    clopidogrel (PLAVIX) 75 mg tablet    Empagliflozin 25 MG TABS    ipratropium (ATROVENT) 0.06 % nasal spray    isosorbide dinitrate (ISORDIL) 30 mg tablet    isosorbide mononitrate (IMDUR) 30 mg 24 hr tablet    levothyroxine 50 mcg tablet    nitroglycerin (NITROSTAT) 0.4 mg SL tablet    pantoprazole (PROTONIX) 40 mg tablet    pantoprazole (PROTONIX) 40 mg tablet    SITagliptin-metFORMIN HCl ER (Janumet XR)  MG TB24    tiotropium (SPIRIVA RESPIMAT) 2.5 MCG/ACT AERS inhaler    Omega-3 Fatty Acids (fish oil) 1,000 mg    pantoprazole (PROTONIX) 40 mg tablet    No Known Allergies        Objective     Blood pressure 137/89, pulse 90, height 5' 6\" (1.676 m), " weight 100 kg (221 lb 3.2 oz), SpO2 95%. Body mass index is 35.7 kg/m².      PHYSICAL EXAM:      General Appearance:   Alert, cooperative, no distress   HEENT:   Normocephalic, atraumatic, anicteric.     Neck:  Supple, symmetrical, trachea midline   Lungs:   Clear to auscultation bilaterally; no rales, rhonchi or wheezing; respirations unlabored    Heart::   Regular rate and rhythm; no murmur, rub, or gallop.   Abdomen:   Soft, non-tender, non-distended; normal bowel sounds; no masses, no organomegaly    Genitalia:   Deferred    Rectal:   Deferred    Extremities:  No cyanosis, clubbing or edema    Pulses:  2+ and symmetric    Skin:  No jaundice, rashes, or lesions    Lymph nodes:  No palpable cervical lymphadenopathy        Lab Results:   No visits with results within 1 Day(s) from this visit.   Latest known visit with results is:   Hospital Outpatient Visit on 10/04/2022   Component Date Value    POC Glucose 10/04/2022 161 (H)     Case Report 10/04/2022                      Value:Surgical Pathology Report                         Case: [id number]                                   Authorizing Provider:  Mejia Marquez DO          Collected:           10/04/2022 0948              Ordering Location:      [facility]       Received:            10/04/2022 1302                                     [facility]                                                             Pathologist:           Arcadio Donahue MD                                                          Specimen:    Polyp, Colorectal, cecum                                                                   Final Diagnosis 10/04/2022                      Value:This result contains rich text formatting which cannot be displayed here.    Additional Information 10/04/2022                      Value:This result contains rich text formatting which cannot be displayed here.    Synoptic Checklist 10/04/2022                      Value:                             COLON/RECTUM POLYP FORM - GI - All Specimens                                                                                     :    Adenoma(s)                                                         :    Other      Gross Description 10/04/2022                      Value:This result contains rich text formatting which cannot be displayed here.    Clinical Information 10/04/2022                      Value:Cold bx polypectomy         Radiology Results:   No results found.  Answers submitted by the patient for this visit:  Abdominal Pain Questionnaire (Submitted on 1/3/2024)  Chief Complaint: Abdominal pain  Progression since onset: resolved  Pain - numeric: 0/10  frequency: Yes

## 2024-08-08 DIAGNOSIS — K21.9 GASTROESOPHAGEAL REFLUX DISEASE WITHOUT ESOPHAGITIS: ICD-10-CM

## 2024-08-08 RX ORDER — PANTOPRAZOLE SODIUM 40 MG/1
40 TABLET, DELAYED RELEASE ORAL DAILY
Qty: 100 TABLET | Refills: 1 | Status: SHIPPED | OUTPATIENT
Start: 2024-08-08

## 2025-02-07 ENCOUNTER — OFFICE VISIT (OUTPATIENT)
Dept: GASTROENTEROLOGY | Facility: CLINIC | Age: 75
End: 2025-02-07
Payer: MEDICARE

## 2025-02-07 VITALS
DIASTOLIC BLOOD PRESSURE: 79 MMHG | TEMPERATURE: 97.6 F | BODY MASS INDEX: 34.66 KG/M2 | OXYGEN SATURATION: 92 % | WEIGHT: 208 LBS | HEIGHT: 65 IN | SYSTOLIC BLOOD PRESSURE: 122 MMHG | HEART RATE: 79 BPM

## 2025-02-07 DIAGNOSIS — K21.9 GASTROESOPHAGEAL REFLUX DISEASE WITHOUT ESOPHAGITIS: Primary | ICD-10-CM

## 2025-02-07 PROCEDURE — 99214 OFFICE O/P EST MOD 30 MIN: CPT | Performed by: INTERNAL MEDICINE

## 2025-02-07 RX ORDER — KETOCONAZOLE 20 MG/ML
SHAMPOO, SUSPENSION TOPICAL
COMMUNITY
Start: 2024-11-19

## 2025-02-07 RX ORDER — TRIAMCINOLONE ACETONIDE 0.25 MG/G
OINTMENT TOPICAL
COMMUNITY
Start: 2024-11-21

## 2025-02-07 RX ORDER — BETAMETHASONE DIPROPIONATE 0.5 MG/G
CREAM TOPICAL
COMMUNITY
Start: 2024-11-19

## 2025-02-08 NOTE — PROGRESS NOTES
"Name: Franki Benoit      : 1950      MRN: [id number]  Encounter Provider: Mejia Marquez DO  Encounter Date: 2025   Encounter department: [facility] [place]  :  Assessment & Plan  Gastroesophageal reflux disease without esophagitis  Continue pantoprazole 40 mg p.o. every morning  No lying down within 1 hour snacking or eating    History of Present Illness     Franki Benoit is a 74 y.o. male who presents for routine follow-up regarding his gastroesophageal reflux disease.  He states that he experiences rare heartburn events.  There is no dysphagia or odynophagia.  He does have occasional episodes of diarrhea but denies constipation.  There is no complaint of abdominal pain, nausea, vomiting, rectal bleeding, melena, hiccups, burping.  The patient is currently on Ozempic and has been for the past several months for the purpose of weight loss.  He states that he does not require a refill of his pantoprazole at the present time.  An EGD performed in 3/18/2021 showed a small sliding hiatal hernia but no other significant abnormalities within the esophagus.  There was no evidence of a stricture but the patient was complaining of dysphagia at that time.  He underwent a empiric dilation with a 54 Setswana savory dilator.      Objective   /79 (BP Location: Right arm, Patient Position: Sitting, Cuff Size: Standard)   Pulse 79   Temp 97.6 °F (36.4 °C) (Temporal)   Ht 5' 5\" (1.651 m)   Wt 94.3 kg (208 lb)   SpO2 92%   BMI 34.61 kg/m²      Physical Exam  Vitals and nursing note reviewed.   Constitutional:       General: He is not in acute distress.     Appearance: Normal appearance. He is well-developed. He is obese.   HENT:      Head: Normocephalic and atraumatic.   Eyes:      General: No scleral icterus.     Conjunctiva/sclera: Conjunctivae normal.   Cardiovascular:      Rate and Rhythm: Normal rate and regular rhythm.      Heart sounds: No murmur heard.  Pulmonary:      " Effort: Pulmonary effort is normal. No respiratory distress.      Breath sounds: Normal breath sounds.   Abdominal:      Palpations: Abdomen is soft. There is no mass.      Tenderness: There is no abdominal tenderness. There is no guarding or rebound.   Musculoskeletal:         General: No swelling.      Cervical back: Neck supple.   Skin:     General: Skin is warm and dry.      Capillary Refill: Capillary refill takes less than 2 seconds.      Coloration: Skin is not jaundiced.      Findings: No rash.   Neurological:      General: No focal deficit present.      Mental Status: He is alert and oriented to person, place, and time.   Psychiatric:         Mood and Affect: Mood normal.         Behavior: Behavior normal.

## 2025-02-28 ENCOUNTER — TELEPHONE (OUTPATIENT)
Age: 75
End: 2025-02-28

## 2025-02-28 NOTE — TELEPHONE ENCOUNTER
Patients GI provider:  Dr. Marquez    Number to return call: [phone]    Reason for call: Pt called requesting to speak to Tammie. Call was transferred to Angy to assist the pt.    Scheduled procedure/appointment date if applicable: N/A

## 2025-08-11 ENCOUNTER — TELEPHONE (OUTPATIENT)
Dept: GASTROENTEROLOGY | Facility: CLINIC | Age: 75
End: 2025-08-11